# Patient Record
Sex: FEMALE | Race: WHITE | Employment: OTHER | URBAN - METROPOLITAN AREA
[De-identification: names, ages, dates, MRNs, and addresses within clinical notes are randomized per-mention and may not be internally consistent; named-entity substitution may affect disease eponyms.]

---

## 2018-03-31 PROBLEM — G93.41 ACUTE METABOLIC ENCEPHALOPATHY: Status: ACTIVE | Noted: 2018-03-31

## 2018-03-31 PROBLEM — I10 HTN (HYPERTENSION): Status: ACTIVE | Noted: 2018-03-31

## 2018-03-31 PROBLEM — N39.0 UTI (URINARY TRACT INFECTION): Status: ACTIVE | Noted: 2018-03-31

## 2018-04-30 PROBLEM — N39.0 UTI (URINARY TRACT INFECTION): Status: RESOLVED | Noted: 2018-03-31 | Resolved: 2018-04-30

## 2018-08-30 ENCOUNTER — HOSPITAL ENCOUNTER (INPATIENT)
Age: 83
LOS: 2 days | Discharge: HOME HEALTH | DRG: 202 | End: 2018-09-02
Attending: EMERGENCY MEDICINE | Admitting: INTERNAL MEDICINE
Payer: MEDICARE

## 2018-08-30 ENCOUNTER — APPOINTMENT (OUTPATIENT)
Dept: GENERAL RADIOLOGY | Age: 83
DRG: 202 | End: 2018-08-30
Payer: MEDICARE

## 2018-08-30 DIAGNOSIS — A41.9 SEPSIS, DUE TO UNSPECIFIED ORGANISM: Primary | ICD-10-CM

## 2018-08-30 DIAGNOSIS — J40 BRONCHITIS: ICD-10-CM

## 2018-08-30 DIAGNOSIS — R41.82 ALTERED MENTAL STATUS, UNSPECIFIED ALTERED MENTAL STATUS TYPE: ICD-10-CM

## 2018-08-30 LAB
ANION GAP SERPL CALCULATED.3IONS-SCNC: 16 MMOL/L (ref 3–16)
B-TYPE NATRIURETIC PEPTIDE: 59 PG/ML (ref 0–99.9)
BASE EXCESS VENOUS: 7 (ref -3–3)
BASOPHILS ABSOLUTE: 0 K/UL (ref 0–0.2)
BASOPHILS RELATIVE PERCENT: 0.3 %
BILIRUBIN URINE: ABNORMAL
BLOOD, URINE: ABNORMAL
BUN BLDV-MCNC: 19 MG/DL (ref 7–20)
CALCIUM SERPL-MCNC: 9.5 MG/DL (ref 8.3–10.6)
CHLORIDE BLD-SCNC: 96 MMOL/L (ref 99–110)
CLARITY: CLEAR
CO2: 26 MMOL/L (ref 21–32)
COLOR: YELLOW
CREAT SERPL-MCNC: 0.9 MG/DL (ref 0.6–1.2)
EOSINOPHILS ABSOLUTE: 0 K/UL (ref 0–0.6)
EOSINOPHILS RELATIVE PERCENT: 0 %
EPITHELIAL CELLS, UA: ABNORMAL /HPF
GFR AFRICAN AMERICAN: >60
GFR NON-AFRICAN AMERICAN: 59
GLUCOSE BLD-MCNC: 145 MG/DL (ref 70–99)
GLUCOSE URINE: NEGATIVE MG/DL
HCO3 VENOUS: 31 MMOL/L (ref 23–29)
HCT VFR BLD CALC: 45.4 % (ref 36–48)
HEMOGLOBIN: 15.4 G/DL (ref 12–16)
KETONES, URINE: 40 MG/DL
LACTATE: 2.23 MMOL/L (ref 0.4–2)
LACTIC ACID, SEPSIS: 1.3 MMOL/L (ref 0.4–1.9)
LEUKOCYTE ESTERASE, URINE: NEGATIVE
LYMPHOCYTES ABSOLUTE: 0.9 K/UL (ref 1–5.1)
LYMPHOCYTES RELATIVE PERCENT: 5.7 %
MCH RBC QN AUTO: 33.4 PG (ref 26–34)
MCHC RBC AUTO-ENTMCNC: 34 G/DL (ref 31–36)
MCV RBC AUTO: 98.2 FL (ref 80–100)
MICROSCOPIC EXAMINATION: YES
MONOCYTES ABSOLUTE: 1.2 K/UL (ref 0–1.3)
MONOCYTES RELATIVE PERCENT: 7.4 %
MUCUS: ABNORMAL /LPF
NEUTROPHILS ABSOLUTE: 13.6 K/UL (ref 1.7–7.7)
NEUTROPHILS RELATIVE PERCENT: 86.6 %
NITRITE, URINE: NEGATIVE
O2 SAT, VEN: 28 %
PCO2, VEN: 46.6 MM HG (ref 40–50)
PDW BLD-RTO: 14.7 % (ref 12.4–15.4)
PERFORMED ON: ABNORMAL
PERFORMED ON: NORMAL
PH UA: 6
PH VENOUS: 7.43 (ref 7.35–7.45)
PLATELET # BLD: 196 K/UL (ref 135–450)
PMV BLD AUTO: 8.9 FL (ref 5–10.5)
PO2, VEN: 18 MM HG
POC SAMPLE TYPE: ABNORMAL
POC SAMPLE TYPE: NORMAL
POTASSIUM REFLEX MAGNESIUM: 5 MMOL/L (ref 3.5–5.1)
PROTEIN UA: NEGATIVE MG/DL
RBC # BLD: 4.62 M/UL (ref 4–5.2)
RBC UA: ABNORMAL /HPF (ref 0–2)
SODIUM BLD-SCNC: 138 MMOL/L (ref 136–145)
SPECIFIC GRAVITY UA: >=1.03
TCO2 CALC VENOUS: 32 MMOL/L
URINE TYPE: ABNORMAL
UROBILINOGEN, URINE: 0.2 E.U./DL
WBC # BLD: 15.7 K/UL (ref 4–11)
WBC UA: ABNORMAL /HPF (ref 0–5)

## 2018-08-30 PROCEDURE — 82803 BLOOD GASES ANY COMBINATION: CPT

## 2018-08-30 PROCEDURE — 80048 BASIC METABOLIC PNL TOTAL CA: CPT

## 2018-08-30 PROCEDURE — 96365 THER/PROPH/DIAG IV INF INIT: CPT

## 2018-08-30 PROCEDURE — 6370000000 HC RX 637 (ALT 250 FOR IP): Performed by: PHYSICIAN ASSISTANT

## 2018-08-30 PROCEDURE — 99285 EMERGENCY DEPT VISIT HI MDM: CPT

## 2018-08-30 PROCEDURE — 87040 BLOOD CULTURE FOR BACTERIA: CPT

## 2018-08-30 PROCEDURE — 93005 ELECTROCARDIOGRAM TRACING: CPT | Performed by: PHYSICIAN ASSISTANT

## 2018-08-30 PROCEDURE — 85025 COMPLETE CBC W/AUTO DIFF WBC: CPT

## 2018-08-30 PROCEDURE — 94667 MNPJ CHEST WALL 1ST: CPT

## 2018-08-30 PROCEDURE — 71045 X-RAY EXAM CHEST 1 VIEW: CPT

## 2018-08-30 PROCEDURE — 94664 DEMO&/EVAL PT USE INHALER: CPT

## 2018-08-30 PROCEDURE — 81001 URINALYSIS AUTO W/SCOPE: CPT

## 2018-08-30 PROCEDURE — 6360000002 HC RX W HCPCS: Performed by: PHYSICIAN ASSISTANT

## 2018-08-30 PROCEDURE — 83880 ASSAY OF NATRIURETIC PEPTIDE: CPT

## 2018-08-30 PROCEDURE — 83605 ASSAY OF LACTIC ACID: CPT

## 2018-08-30 PROCEDURE — 94761 N-INVAS EAR/PLS OXIMETRY MLT: CPT

## 2018-08-30 PROCEDURE — 2580000003 HC RX 258: Performed by: PHYSICIAN ASSISTANT

## 2018-08-30 RX ORDER — SODIUM CHLORIDE 0.9 % (FLUSH) 0.9 %
10 SYRINGE (ML) INJECTION PRN
Status: DISCONTINUED | OUTPATIENT
Start: 2018-08-30 | End: 2018-09-02 | Stop reason: HOSPADM

## 2018-08-30 RX ORDER — 0.9 % SODIUM CHLORIDE 0.9 %
15 INTRAVENOUS SOLUTION INTRAVENOUS ONCE
Status: COMPLETED | OUTPATIENT
Start: 2018-08-30 | End: 2018-08-30

## 2018-08-30 RX ORDER — SODIUM CHLORIDE 0.9 % (FLUSH) 0.9 %
10 SYRINGE (ML) INJECTION EVERY 12 HOURS SCHEDULED
Status: DISCONTINUED | OUTPATIENT
Start: 2018-08-30 | End: 2018-09-02 | Stop reason: HOSPADM

## 2018-08-30 RX ADMIN — LIDOCAINE HYDROCHLORIDE 15 ML: 20 SOLUTION ORAL; TOPICAL at 23:54

## 2018-08-30 RX ADMIN — Medication 10 ML: at 20:41

## 2018-08-30 RX ADMIN — CEFTRIAXONE 1 G: 1 INJECTION, POWDER, FOR SOLUTION INTRAMUSCULAR; INTRAVENOUS at 20:44

## 2018-08-30 RX ADMIN — SODIUM CHLORIDE 803 ML: 9 INJECTION, SOLUTION INTRAVENOUS at 20:43

## 2018-08-31 PROBLEM — J20.9 ACUTE BRONCHITIS: Status: ACTIVE | Noted: 2018-08-31

## 2018-08-31 PROBLEM — F03.90 DEMENTIA (HCC): Chronic | Status: ACTIVE | Noted: 2018-08-31

## 2018-08-31 PROBLEM — G93.41 ACUTE METABOLIC ENCEPHALOPATHY: Status: ACTIVE | Noted: 2018-08-31

## 2018-08-31 PROBLEM — A41.9 SEPSIS (HCC): Status: ACTIVE | Noted: 2018-08-31

## 2018-08-31 LAB
HCT VFR BLD CALC: 44.1 % (ref 36–48)
HEMOGLOBIN: 15 G/DL (ref 12–16)
LACTIC ACID, SEPSIS: 1 MMOL/L (ref 0.4–1.9)
MCH RBC QN AUTO: 33.4 PG (ref 26–34)
MCHC RBC AUTO-ENTMCNC: 33.9 G/DL (ref 31–36)
MCV RBC AUTO: 98.5 FL (ref 80–100)
PDW BLD-RTO: 14.3 % (ref 12.4–15.4)
PLATELET # BLD: 162 K/UL (ref 135–450)
PMV BLD AUTO: 9.7 FL (ref 5–10.5)
RAPID INFLUENZA  B AGN: NEGATIVE
RAPID INFLUENZA A AGN: NEGATIVE
RBC # BLD: 4.48 M/UL (ref 4–5.2)
WBC # BLD: 14.5 K/UL (ref 4–11)

## 2018-08-31 PROCEDURE — 94668 MNPJ CHEST WALL SBSQ: CPT

## 2018-08-31 PROCEDURE — 94640 AIRWAY INHALATION TREATMENT: CPT

## 2018-08-31 PROCEDURE — 31720 CLEARANCE OF AIRWAYS: CPT

## 2018-08-31 PROCEDURE — 87070 CULTURE OTHR SPECIMN AEROBIC: CPT

## 2018-08-31 PROCEDURE — 94150 VITAL CAPACITY TEST: CPT

## 2018-08-31 PROCEDURE — 85027 COMPLETE CBC AUTOMATED: CPT

## 2018-08-31 PROCEDURE — 94664 DEMO&/EVAL PT USE INHALER: CPT

## 2018-08-31 PROCEDURE — 87804 INFLUENZA ASSAY W/OPTIC: CPT

## 2018-08-31 PROCEDURE — 2700000000 HC OXYGEN THERAPY PER DAY

## 2018-08-31 PROCEDURE — 6360000002 HC RX W HCPCS

## 2018-08-31 PROCEDURE — 6360000002 HC RX W HCPCS: Performed by: FAMILY MEDICINE

## 2018-08-31 PROCEDURE — 6370000000 HC RX 637 (ALT 250 FOR IP): Performed by: FAMILY MEDICINE

## 2018-08-31 PROCEDURE — 87205 SMEAR GRAM STAIN: CPT

## 2018-08-31 PROCEDURE — 2580000003 HC RX 258: Performed by: FAMILY MEDICINE

## 2018-08-31 PROCEDURE — 2580000003 HC RX 258: Performed by: PHYSICIAN ASSISTANT

## 2018-08-31 PROCEDURE — 6360000002 HC RX W HCPCS: Performed by: PHYSICIAN ASSISTANT

## 2018-08-31 PROCEDURE — 94761 N-INVAS EAR/PLS OXIMETRY MLT: CPT

## 2018-08-31 PROCEDURE — 36415 COLL VENOUS BLD VENIPUNCTURE: CPT

## 2018-08-31 PROCEDURE — 1200000000 HC SEMI PRIVATE

## 2018-08-31 RX ORDER — GUAIFENESIN/DEXTROMETHORPHAN 100-10MG/5
5 SYRUP ORAL EVERY 4 HOURS PRN
Status: DISCONTINUED | OUTPATIENT
Start: 2018-08-31 | End: 2018-09-02 | Stop reason: HOSPADM

## 2018-08-31 RX ORDER — ALBUTEROL SULFATE 2.5 MG/3ML
2.5 SOLUTION RESPIRATORY (INHALATION) 4 TIMES DAILY
Status: DISCONTINUED | OUTPATIENT
Start: 2018-08-31 | End: 2018-09-02 | Stop reason: HOSPADM

## 2018-08-31 RX ORDER — 0.9 % SODIUM CHLORIDE 0.9 %
15 INTRAVENOUS SOLUTION INTRAVENOUS ONCE
Status: COMPLETED | OUTPATIENT
Start: 2018-08-31 | End: 2018-08-31

## 2018-08-31 RX ORDER — FOLIC ACID 1 MG/1
1 TABLET ORAL DAILY
Status: DISCONTINUED | OUTPATIENT
Start: 2018-08-31 | End: 2018-09-02 | Stop reason: HOSPADM

## 2018-08-31 RX ORDER — ALBUTEROL SULFATE 2.5 MG/3ML
2.5 SOLUTION RESPIRATORY (INHALATION)
Status: DISCONTINUED | OUTPATIENT
Start: 2018-08-31 | End: 2018-08-31

## 2018-08-31 RX ORDER — SODIUM CHLORIDE 0.9 % (FLUSH) 0.9 %
10 SYRINGE (ML) INJECTION PRN
Status: DISCONTINUED | OUTPATIENT
Start: 2018-08-31 | End: 2018-08-31

## 2018-08-31 RX ORDER — ALBUTEROL SULFATE 2.5 MG/3ML
2.5 SOLUTION RESPIRATORY (INHALATION) EVERY 6 HOURS PRN
Status: DISCONTINUED | OUTPATIENT
Start: 2018-08-31 | End: 2018-09-02 | Stop reason: HOSPADM

## 2018-08-31 RX ORDER — GUAIFENESIN 600 MG/1
600 TABLET, EXTENDED RELEASE ORAL 2 TIMES DAILY
Status: DISCONTINUED | OUTPATIENT
Start: 2018-08-31 | End: 2018-09-02 | Stop reason: HOSPADM

## 2018-08-31 RX ORDER — LEVOTHYROXINE SODIUM 0.03 MG/1
25 TABLET ORAL DAILY
Status: DISCONTINUED | OUTPATIENT
Start: 2018-08-31 | End: 2018-09-02 | Stop reason: HOSPADM

## 2018-08-31 RX ORDER — ACETAMINOPHEN 325 MG/1
650 TABLET ORAL EVERY 4 HOURS PRN
Status: DISCONTINUED | OUTPATIENT
Start: 2018-08-31 | End: 2018-09-02 | Stop reason: HOSPADM

## 2018-08-31 RX ORDER — SODIUM CHLORIDE 0.9 % (FLUSH) 0.9 %
10 SYRINGE (ML) INJECTION EVERY 12 HOURS SCHEDULED
Status: DISCONTINUED | OUTPATIENT
Start: 2018-08-31 | End: 2018-08-31

## 2018-08-31 RX ORDER — ONDANSETRON 2 MG/ML
4 INJECTION INTRAMUSCULAR; INTRAVENOUS EVERY 8 HOURS PRN
Status: DISCONTINUED | OUTPATIENT
Start: 2018-08-31 | End: 2018-09-02 | Stop reason: HOSPADM

## 2018-08-31 RX ORDER — LISINOPRIL 20 MG/1
20 TABLET ORAL DAILY
Status: DISCONTINUED | OUTPATIENT
Start: 2018-08-31 | End: 2018-09-02 | Stop reason: HOSPADM

## 2018-08-31 RX ORDER — ONDANSETRON 2 MG/ML
4 INJECTION INTRAMUSCULAR; INTRAVENOUS ONCE
Status: COMPLETED | OUTPATIENT
Start: 2018-08-31 | End: 2018-08-31

## 2018-08-31 RX ORDER — PANTOPRAZOLE SODIUM 40 MG/1
40 TABLET, DELAYED RELEASE ORAL DAILY
Status: DISCONTINUED | OUTPATIENT
Start: 2018-08-31 | End: 2018-09-02 | Stop reason: HOSPADM

## 2018-08-31 RX ORDER — AZITHROMYCIN 250 MG/1
250 TABLET, FILM COATED ORAL DAILY
Status: DISCONTINUED | OUTPATIENT
Start: 2018-08-31 | End: 2018-09-02 | Stop reason: HOSPADM

## 2018-08-31 RX ORDER — ONDANSETRON 2 MG/ML
INJECTION INTRAMUSCULAR; INTRAVENOUS
Status: COMPLETED
Start: 2018-08-31 | End: 2018-08-31

## 2018-08-31 RX ADMIN — ALBUTEROL SULFATE 2.5 MG: 2.5 SOLUTION RESPIRATORY (INHALATION) at 03:12

## 2018-08-31 RX ADMIN — AZITHROMYCIN 250 MG: 250 TABLET, FILM COATED ORAL at 10:47

## 2018-08-31 RX ADMIN — CEFTRIAXONE 1 G: 1 INJECTION, POWDER, FOR SOLUTION INTRAMUSCULAR; INTRAVENOUS at 21:14

## 2018-08-31 RX ADMIN — ENOXAPARIN SODIUM 30 MG: 30 INJECTION SUBCUTANEOUS at 10:48

## 2018-08-31 RX ADMIN — ALBUTEROL SULFATE 2.5 MG: 2.5 SOLUTION RESPIRATORY (INHALATION) at 11:57

## 2018-08-31 RX ADMIN — ALBUTEROL SULFATE 2.5 MG: 2.5 SOLUTION RESPIRATORY (INHALATION) at 22:06

## 2018-08-31 RX ADMIN — GUAIFENESIN 600 MG: 600 TABLET, EXTENDED RELEASE ORAL at 21:14

## 2018-08-31 RX ADMIN — ONDANSETRON 4 MG: 2 INJECTION INTRAMUSCULAR; INTRAVENOUS at 01:28

## 2018-08-31 RX ADMIN — ONDANSETRON HYDROCHLORIDE 4 MG: 2 INJECTION, SOLUTION INTRAMUSCULAR; INTRAVENOUS at 01:28

## 2018-08-31 RX ADMIN — GUAIFENESIN 600 MG: 600 TABLET, EXTENDED RELEASE ORAL at 10:48

## 2018-08-31 RX ADMIN — LISINOPRIL 20 MG: 20 TABLET ORAL at 10:48

## 2018-08-31 RX ADMIN — PANTOPRAZOLE SODIUM 40 MG: 40 TABLET, DELAYED RELEASE ORAL at 10:48

## 2018-08-31 RX ADMIN — FOLIC ACID 1 MG: 1 TABLET ORAL at 10:47

## 2018-08-31 RX ADMIN — GUAIFENESIN 600 MG: 600 TABLET, EXTENDED RELEASE ORAL at 06:23

## 2018-08-31 RX ADMIN — LEVOTHYROXINE SODIUM 25 MCG: 25 TABLET ORAL at 06:22

## 2018-08-31 RX ADMIN — ALBUTEROL SULFATE 2.5 MG: 2.5 SOLUTION RESPIRATORY (INHALATION) at 08:14

## 2018-08-31 RX ADMIN — ALBUTEROL SULFATE 2.5 MG: 2.5 SOLUTION RESPIRATORY (INHALATION) at 15:45

## 2018-08-31 RX ADMIN — Medication 10 ML: at 21:14

## 2018-08-31 RX ADMIN — Medication 10 ML: at 10:48

## 2018-08-31 RX ADMIN — GUAIFENESIN AND DEXTROMETHORPHAN 5 ML: 100; 10 SYRUP ORAL at 12:45

## 2018-08-31 RX ADMIN — SODIUM CHLORIDE 803 ML: 9 INJECTION, SOLUTION INTRAVENOUS at 06:23

## 2018-08-31 ASSESSMENT — PAIN DESCRIPTION - DESCRIPTORS: DESCRIPTORS: DISCOMFORT

## 2018-08-31 ASSESSMENT — PAIN DESCRIPTION - PAIN TYPE: TYPE: ACUTE PAIN

## 2018-08-31 ASSESSMENT — PAIN DESCRIPTION - FREQUENCY: FREQUENCY: CONTINUOUS

## 2018-08-31 ASSESSMENT — PAIN DESCRIPTION - LOCATION: LOCATION: THROAT

## 2018-08-31 ASSESSMENT — PAIN DESCRIPTION - PROGRESSION: CLINICAL_PROGRESSION: NOT CHANGED

## 2018-08-31 ASSESSMENT — PAIN SCALES - GENERAL: PAINLEVEL_OUTOF10: 7

## 2018-08-31 ASSESSMENT — PAIN DESCRIPTION - ONSET: ONSET: ON-GOING

## 2018-08-31 NOTE — H&P
Hospital Medicine History & Physical      PCP: Jannet Selby    Date of Admission: 8/30/2018    Date of Service: Pt seen/examined on 8/30/2018  7:30 PM and   Admitted to Inpatient with expected LOS greater than two midnights due to medical therapy. Chief Complaint:  Cough    History Of Present Illness:    80 y.o. female with PMHx significant for esophageal stricture dementia  presented to [facility] from home   with coughing  These symptoms started 2-3 days ago  Pt complains of: Productive cough  Pt denies/without: night sweats, weight changes, poor appetite, headache, chest pain, palpatations, pleuritic chest pain, shorness of breath, nausea, vomiting, diarrhea, dysuria, hematuria, hematochezia, melena, edema, rash, weakness, confusion, lightheadedness or paresthesia. The patient has not tried any additional therapies or medications in addition to their regular medicines in order to modulate their symptoms. Past Medical History:          Diagnosis Date    Hypertension        Past Surgical History:          Procedure Laterality Date    OTHER SURGICAL HISTORY  12/4/14    ERCP foreigh body removal       Medications Prior to Admission:      Prior to Admission medications    Medication Sig Start Date End Date Taking? Authorizing Provider   folic acid (FOLVITE) 1 MG tablet Take 1 tablet by mouth daily 4/4/18  Yes Loretta Trujillo MD   levothyroxine (SYNTHROID) 25 MCG tablet Take 1 tablet by mouth Daily 4/4/18  Yes Loretta Trujillo MD   esomeprazole Magnesium (NEXIUM) 40 MG PACK Take 1 packet by mouth 2 times daily. 12/5/14  Yes Lidia Arriaga MD   lisinopril (PRINIVIL;ZESTRIL) 20 MG tablet Take 20 mg by mouth daily. Yes Historical Provider, MD       Allergies:  Cephalosporins    Social History:      The patient currently lives Alone    TOBACCO:   reports that she has quit smoking. Her smoking use included Cigarettes. She quit after 10.00 years of use.  She does not have any smokeless tobacco history on file. ETOH:   reports that she drinks about 0.6 oz of alcohol per week . Family History:      Reviewed in detail and negative for DM, CAD, Cancer, CVA. Positive as follows:    History reviewed. No pertinent family history. REVIEW OF SYSTEMS:   Pertinent positives as noted in the HPI. All other systems reviewed and negative. PHYSICAL EXAM:    BP (!) 159/84   Pulse 88   Temp 98.8 °F (37.1 °C) (Oral)   Resp 16   Ht 5' 2\" (1.575 m)   Wt 118 lb (53.5 kg)   SpO2 98%   BMI 21.58 kg/m²     General appearance:  No apparent distress, appears stated age and cooperative. HEENT:  Normal cephalic, atraumatic without obvious deformity. Pupils equal, round, and reactive to light. Extra ocular muscles intact. Conjunctivae/corneas clear. Neck: Supple, with full range of motion. No jugular venous distention. Trachea midline. Respiratory:  Normal respiratory effort. Clear to auscultation, bilaterally without RALES/WHEEZES/RHONCHI. Cardiovascular:  Regular rate and rhythm with normal S1/S2 without MURMUR, rubs or gallops. Abdomen: Soft, non-tender, non-distended with normal bowel sounds. Musculoskeletal:  No clubbing, cyanosis or EDEMA bilaterally. Full range of motion without deformity. Skin: Skin color, texture, turgor normal.  No rashes or lesions. Neurologic:  Neurovascularly intact without any focal sensory/motor deficits. Cranial nerves: II-XII intact, grossly non-focal.  Psychiatric:  Alert and oriented, thought content appropriate, normal insight  Capillary Refill: Brisk,< 3 seconds   Peripheral Pulses: +2 palpable, equal bilaterally       Labs:     Recent Labs      08/30/18 2002 08/31/18   0631   WBC  15.7*  14.5*   HGB  15.4  15.0   HCT  45.4  44.1   PLT  196  162     Recent Labs      08/30/18 2002   NA  138   K  5.0   CL  96*   CO2  26   BUN  19   CREATININE  0.9   CALCIUM  9.5     No results for input(s): AST, ALT, BILIDIR, BILITOT, ALKPHOS in the last 72 hours.   No results for

## 2018-08-31 NOTE — PROGRESS NOTES
RESPIRATORY THERAPY ASSESSMENT    Name:  Terri London  Medical Record Number:  [id number]  Age: 80 y.o. Gender: female  : 3/26/1927  Today's Date:  2018  Room:  63096309-01    Assessment     Is the patient being admitted for a COPD or Asthma exacerbation? No   (If yes the patient will be seen every 4 hours for the first 24 hours and then reassessed)    Patient Admission Diagnosis acute bronchitis      Allergies  Allergies   Allergen Reactions    Cephalosporins Rash       Minimum Predicted Vital Capacity:     748          Actual Vital Capacity:      250          Pulmonary History:former smoker but pt denies any pulmonary history  Home Oxygen Therapy:  room air  Home Respiratory Therapy:None   Current Respiratory Therapy:  hhn with albuterol q4wa  Treatment Type: Vibratory Mucous Clearing Therapy or Intervention       Respiratory Severity Index(RSI)   Patients with orders for inhalation medications, oxygen, or any therapeutic treatment modality will be placed on Respiratory Protocol. They will be assessed with the first treatment and at least every 72 hours thereafter. The following severity scale will be used to determine frequency of treatment intervention.     Smoking History: Pulmonary Disease or Smoking History, Greater than 15 pack year = 2    Social History  Social History   Substance Use Topics    Smoking status: Former Smoker     Years: 10.00     Types: Cigarettes    Smokeless tobacco: Not on file    Alcohol use 0.6 oz/week     1 Glasses of wine per week      Comment: occasionally        Recent Surgical History: None = 0  Past Surgical History  Past Surgical History:   Procedure Laterality Date    OTHER SURGICAL HISTORY  14    ERCP foreigh body removal       Level of Consciousness: Alert, Oriented, and Cooperative = 0    Level of Activity: Walking with assistance = 1    Respiratory Pattern: Dyspnea with exertion;Irregular pattern;or RR less than 6 = 2    Breath Sounds: Absent bilaterally and/or with wheezes = 3    Sputum  Sputum Color: Unable to assess, Tenacity: Frothy, Sputum How Obtained: Cough on request, Spontaneous cough  Cough: Strong, productive = 1    Vital Signs   BP (!) 172/89   Pulse 95   Temp 98 °F (36.7 °C) (Oral)   Resp 18   Ht 5' 2\" (1.575 m)   Wt 118 lb (53.5 kg)   SpO2 94%   BMI 21.58 kg/m²   SPO2 (COPD values may differ): 90-91% on room air or greater than 92% on FiO2 24- 28% = 1    Peak Flow (asthma only): not applicable = 0    RSI: 61-81 = Q6H or QID and Q4HPRN for dyspnea        Plan       Goals: medication delivery, mobilize retained secretions, volume expansion and improve oxygenation    Patient/caregiver was educated on the proper method of use for Respiratory Care Devices:  Yes      Level of patient/caregiver understanding able to:   [] Verbalize understanding   [] Demonstrate understanding       [] Teach back        [] Needs reinforcement       []  No available caregiver               []  Other:     Response to education:  Tiff Mcintosh     Is patient being placed on Home Treatment Regimen? No     Does the patient have everything they need prior to discharge? NA     Comments: pt admitted through ED tonight with acute bronchitis    Plan of Care: continue hhn with albuterol/ns qid and prn    Electronically signed by Deric Uribe RCP on 8/31/2018 at 3:26 AM    Respiratory Protocol Guidelines     1. Assessment and treatment by Respiratory Therapy will be initiated for medication and therapeutic interventions upon initiation of aerosolized medication. 2. Physician will be contacted for respiratory rate (RR) greater than 35 breaths per minute. Therapy will be held for heart rate (HR) greater than 140 beats per minute, pending direction from physician. 3. Bronchodilators will be administered via Metered Dose Inhaler (MDI) with spacer when the following criteria are met:  a.  Alert and cooperative     b. HR < 140 bpm  c. RR < 30 bpm                d. Can

## 2018-09-01 LAB
ANION GAP SERPL CALCULATED.3IONS-SCNC: 9 MMOL/L (ref 3–16)
BUN BLDV-MCNC: 14 MG/DL (ref 7–20)
CALCIUM SERPL-MCNC: 8.7 MG/DL (ref 8.3–10.6)
CHLORIDE BLD-SCNC: 101 MMOL/L (ref 99–110)
CO2: 26 MMOL/L (ref 21–32)
CREAT SERPL-MCNC: 0.9 MG/DL (ref 0.6–1.2)
GFR AFRICAN AMERICAN: >60
GFR NON-AFRICAN AMERICAN: 59
GLUCOSE BLD-MCNC: 92 MG/DL (ref 70–99)
HCT VFR BLD CALC: 38.4 % (ref 36–48)
HEMOGLOBIN: 13 G/DL (ref 12–16)
MCH RBC QN AUTO: 33.4 PG (ref 26–34)
MCHC RBC AUTO-ENTMCNC: 33.8 G/DL (ref 31–36)
MCV RBC AUTO: 98.9 FL (ref 80–100)
PDW BLD-RTO: 14.3 % (ref 12.4–15.4)
PLATELET # BLD: 133 K/UL (ref 135–450)
PMV BLD AUTO: 9.2 FL (ref 5–10.5)
POTASSIUM SERPL-SCNC: 3.6 MMOL/L (ref 3.5–5.1)
RBC # BLD: 3.88 M/UL (ref 4–5.2)
SODIUM BLD-SCNC: 136 MMOL/L (ref 136–145)
WBC # BLD: 8.7 K/UL (ref 4–11)

## 2018-09-01 PROCEDURE — 6360000002 HC RX W HCPCS: Performed by: FAMILY MEDICINE

## 2018-09-01 PROCEDURE — 85027 COMPLETE CBC AUTOMATED: CPT

## 2018-09-01 PROCEDURE — 36415 COLL VENOUS BLD VENIPUNCTURE: CPT

## 2018-09-01 PROCEDURE — 94664 DEMO&/EVAL PT USE INHALER: CPT

## 2018-09-01 PROCEDURE — 6370000000 HC RX 637 (ALT 250 FOR IP): Performed by: FAMILY MEDICINE

## 2018-09-01 PROCEDURE — 6360000002 HC RX W HCPCS: Performed by: PHYSICIAN ASSISTANT

## 2018-09-01 PROCEDURE — 94640 AIRWAY INHALATION TREATMENT: CPT

## 2018-09-01 PROCEDURE — 94761 N-INVAS EAR/PLS OXIMETRY MLT: CPT

## 2018-09-01 PROCEDURE — 80048 BASIC METABOLIC PNL TOTAL CA: CPT

## 2018-09-01 PROCEDURE — 94668 MNPJ CHEST WALL SBSQ: CPT

## 2018-09-01 PROCEDURE — 1200000000 HC SEMI PRIVATE

## 2018-09-01 PROCEDURE — 2580000003 HC RX 258: Performed by: PHYSICIAN ASSISTANT

## 2018-09-01 RX ORDER — UREA 10 %
3 LOTION (ML) TOPICAL NIGHTLY PRN
Status: DISCONTINUED | OUTPATIENT
Start: 2018-09-01 | End: 2018-09-02 | Stop reason: HOSPADM

## 2018-09-01 RX ADMIN — FOLIC ACID 1 MG: 1 TABLET ORAL at 08:19

## 2018-09-01 RX ADMIN — GUAIFENESIN AND DEXTROMETHORPHAN 5 ML: 100; 10 SYRUP ORAL at 13:04

## 2018-09-01 RX ADMIN — AZITHROMYCIN 250 MG: 250 TABLET, FILM COATED ORAL at 08:18

## 2018-09-01 RX ADMIN — ALBUTEROL SULFATE 2.5 MG: 2.5 SOLUTION RESPIRATORY (INHALATION) at 16:32

## 2018-09-01 RX ADMIN — PANTOPRAZOLE SODIUM 40 MG: 40 TABLET, DELAYED RELEASE ORAL at 08:19

## 2018-09-01 RX ADMIN — LISINOPRIL 20 MG: 20 TABLET ORAL at 08:18

## 2018-09-01 RX ADMIN — ALBUTEROL SULFATE 2.5 MG: 2.5 SOLUTION RESPIRATORY (INHALATION) at 20:24

## 2018-09-01 RX ADMIN — GUAIFENESIN 600 MG: 600 TABLET, EXTENDED RELEASE ORAL at 08:18

## 2018-09-01 RX ADMIN — Medication 10 ML: at 08:19

## 2018-09-01 RX ADMIN — GUAIFENESIN 600 MG: 600 TABLET, EXTENDED RELEASE ORAL at 21:15

## 2018-09-01 RX ADMIN — ALBUTEROL SULFATE 2.5 MG: 2.5 SOLUTION RESPIRATORY (INHALATION) at 13:22

## 2018-09-01 RX ADMIN — LEVOTHYROXINE SODIUM 25 MCG: 25 TABLET ORAL at 05:32

## 2018-09-01 RX ADMIN — ACETAMINOPHEN 650 MG: 325 TABLET ORAL at 13:04

## 2018-09-01 RX ADMIN — ENOXAPARIN SODIUM 30 MG: 30 INJECTION SUBCUTANEOUS at 08:19

## 2018-09-01 RX ADMIN — GUAIFENESIN AND DEXTROMETHORPHAN 5 ML: 100; 10 SYRUP ORAL at 21:15

## 2018-09-01 RX ADMIN — Medication 10 ML: at 21:04

## 2018-09-01 RX ADMIN — CEFTRIAXONE 1 G: 1 INJECTION, POWDER, FOR SOLUTION INTRAMUSCULAR; INTRAVENOUS at 21:04

## 2018-09-01 RX ADMIN — ACETAMINOPHEN 650 MG: 325 TABLET ORAL at 01:42

## 2018-09-01 RX ADMIN — ALBUTEROL SULFATE 2.5 MG: 2.5 SOLUTION RESPIRATORY (INHALATION) at 09:46

## 2018-09-01 ASSESSMENT — PAIN DESCRIPTION - LOCATION: LOCATION: HEAD

## 2018-09-01 ASSESSMENT — PAIN SCALES - GENERAL
PAINLEVEL_OUTOF10: 3
PAINLEVEL_OUTOF10: 0
PAINLEVEL_OUTOF10: 3

## 2018-09-01 ASSESSMENT — PAIN DESCRIPTION - ORIENTATION: ORIENTATION: MID

## 2018-09-01 ASSESSMENT — PAIN DESCRIPTION - PROGRESSION: CLINICAL_PROGRESSION: NOT CHANGED

## 2018-09-01 ASSESSMENT — PAIN DESCRIPTION - PAIN TYPE: TYPE: ACUTE PAIN

## 2018-09-01 ASSESSMENT — PAIN DESCRIPTION - FREQUENCY: FREQUENCY: CONTINUOUS

## 2018-09-01 ASSESSMENT — PAIN DESCRIPTION - ONSET: ONSET: GRADUAL

## 2018-09-01 ASSESSMENT — PAIN DESCRIPTION - DESCRIPTORS: DESCRIPTORS: HEADACHE

## 2018-09-01 NOTE — CARE COORDINATION
2018  [facility])  Clinical Case Management Department    Patient: Mickey Head  MRN: [id number] / : 3/26/1927  ACCT: [de-identified]     Emergency Contacts  Contact 1: Name: Claudia Cortez   Contact 1: Number: [phone]  Contact 1: Relationship: daughter   Contact 2: Name: Lauren Roca   Contact 2: Relationship:    Healthcare Agent Appointed: Angelinaradha Butterfield Agent's Name: [facility] Agent's Phone Number: [phone]    Admission Documentation  Attending Provider: Kim Rodriguez MD  Admit date/time: 2018  7:30 PM  Status: Inpatient  Diagnosis: Acute bronchitis     Readmission within last 30 days:  no     Living Situation  Discharge Planning  Living Arrangements: Alone  Support Systems: Children, Family Members  Type of Home Care Services: Safety Alert  Patient expects to be discharged to[de-identified] home   Expected Discharge Date: 18    Service Assessment       Values / Beliefs  Do you have any ethnic, cultural, sacramental, or spiritual Bahai needs you would like us to be aware of while you are in the hospital?: No    Advance Directives (For Healthcare)  Pre-existing DNR Comfort Care/DNR Arrest/DNI Order: Yes, notify physician for order  Healthcare Directive: Yes, patient has an advance directive for healthcare treatment  Type of Healthcare Directive: Living will, Durable power of  for health care  Copy in Chart: No, copy requested from family  Healthcare Agent Appointed: Angelinaradha Greer Agent's Name: [facility] Agent's Phone Number: [phone]  If you are unable to speak for yourself, does your Healthcare Agent or Legal Spokesperson know your healthcare wishes?: Yes                        Destination  From home alone     Durable Medical Equipment   None [facility] to [facility] [address] care at home?  No Provider:  Provider Phone:   Daughter states an aide from [address] does come to patient's home

## 2018-09-01 NOTE — PLAN OF CARE
Problem: Falls - Risk of:  Goal: Will remain free from falls  Will remain free from falls   Outcome: Ongoing  Patient oriented to ability and appropriately calls for needs. Bed locked in lowest position, alarm set. Fall precautions in place. Problem: Pain:  Goal: Pain level will decrease  Pain level will decrease   Outcome: Ongoing  Patient c/o headache this afternoon. Resting quietly with eyes closed after administration of Tylenol. Patient denies headache this evening. Problem: Nutrition  Goal: Optimal nutrition therapy  Outcome: Ongoing  Pt consumes <50% of meals today. PO intake encouraged, pt verbalizes understanding of importance of nutrition, states \"I just don't have an appetite today\".

## 2018-09-02 VITALS
TEMPERATURE: 99.1 F | WEIGHT: 118 LBS | BODY MASS INDEX: 21.71 KG/M2 | SYSTOLIC BLOOD PRESSURE: 160 MMHG | OXYGEN SATURATION: 95 % | HEIGHT: 62 IN | RESPIRATION RATE: 18 BRPM | HEART RATE: 92 BPM | DIASTOLIC BLOOD PRESSURE: 78 MMHG

## 2018-09-02 LAB
CULTURE, RESPIRATORY: NORMAL
GRAM STAIN RESULT: NORMAL
HCT VFR BLD CALC: 39.8 % (ref 36–48)
HEMOGLOBIN: 13.5 G/DL (ref 12–16)
MCH RBC QN AUTO: 33.8 PG (ref 26–34)
MCHC RBC AUTO-ENTMCNC: 33.9 G/DL (ref 31–36)
MCV RBC AUTO: 99.8 FL (ref 80–100)
PDW BLD-RTO: 14.4 % (ref 12.4–15.4)
PLATELET # BLD: 153 K/UL (ref 135–450)
PMV BLD AUTO: 9.2 FL (ref 5–10.5)
RBC # BLD: 3.99 M/UL (ref 4–5.2)
WBC # BLD: 9 K/UL (ref 4–11)

## 2018-09-02 PROCEDURE — 94640 AIRWAY INHALATION TREATMENT: CPT

## 2018-09-02 PROCEDURE — 6360000002 HC RX W HCPCS: Performed by: FAMILY MEDICINE

## 2018-09-02 PROCEDURE — 6370000000 HC RX 637 (ALT 250 FOR IP): Performed by: FAMILY MEDICINE

## 2018-09-02 PROCEDURE — 36415 COLL VENOUS BLD VENIPUNCTURE: CPT

## 2018-09-02 PROCEDURE — 94761 N-INVAS EAR/PLS OXIMETRY MLT: CPT

## 2018-09-02 PROCEDURE — 2580000003 HC RX 258: Performed by: PHYSICIAN ASSISTANT

## 2018-09-02 PROCEDURE — 85027 COMPLETE CBC AUTOMATED: CPT

## 2018-09-02 RX ORDER — LEVOFLOXACIN 500 MG/1
500 TABLET, FILM COATED ORAL DAILY
Qty: 5 TABLET | Refills: 0 | Status: SHIPPED | OUTPATIENT
Start: 2018-09-02 | End: 2018-09-02

## 2018-09-02 RX ORDER — LEVOFLOXACIN 500 MG/1
500 TABLET, FILM COATED ORAL DAILY
Qty: 5 TABLET | Refills: 0 | Status: SHIPPED | OUTPATIENT
Start: 2018-09-02 | End: 2018-09-07

## 2018-09-02 RX ADMIN — ALBUTEROL SULFATE 2.5 MG: 2.5 SOLUTION RESPIRATORY (INHALATION) at 08:38

## 2018-09-02 RX ADMIN — FOLIC ACID 1 MG: 1 TABLET ORAL at 08:49

## 2018-09-02 RX ADMIN — AZITHROMYCIN 250 MG: 250 TABLET, FILM COATED ORAL at 08:49

## 2018-09-02 RX ADMIN — LISINOPRIL 20 MG: 20 TABLET ORAL at 08:49

## 2018-09-02 RX ADMIN — PANTOPRAZOLE SODIUM 40 MG: 40 TABLET, DELAYED RELEASE ORAL at 08:49

## 2018-09-02 RX ADMIN — ENOXAPARIN SODIUM 30 MG: 30 INJECTION SUBCUTANEOUS at 08:49

## 2018-09-02 RX ADMIN — ACETAMINOPHEN 650 MG: 325 TABLET ORAL at 08:49

## 2018-09-02 RX ADMIN — Medication 10 ML: at 08:50

## 2018-09-02 RX ADMIN — LEVOTHYROXINE SODIUM 25 MCG: 25 TABLET ORAL at 06:19

## 2018-09-02 RX ADMIN — GUAIFENESIN 600 MG: 600 TABLET, EXTENDED RELEASE ORAL at 08:49

## 2018-09-02 ASSESSMENT — PAIN DESCRIPTION - DESCRIPTORS: DESCRIPTORS: HEADACHE

## 2018-09-02 ASSESSMENT — PAIN SCALES - GENERAL
PAINLEVEL_OUTOF10: 0
PAINLEVEL_OUTOF10: 0
PAINLEVEL_OUTOF10: 3

## 2018-09-02 ASSESSMENT — PAIN DESCRIPTION - PAIN TYPE: TYPE: ACUTE PAIN

## 2018-09-02 ASSESSMENT — PAIN DESCRIPTION - LOCATION: LOCATION: HEAD

## 2018-09-02 ASSESSMENT — PAIN DESCRIPTION - FREQUENCY: FREQUENCY: INTERMITTENT

## 2018-09-02 ASSESSMENT — PAIN DESCRIPTION - ORIENTATION: ORIENTATION: POSTERIOR

## 2018-09-02 ASSESSMENT — PAIN DESCRIPTION - PROGRESSION: CLINICAL_PROGRESSION: GRADUALLY WORSENING

## 2018-09-02 ASSESSMENT — PAIN DESCRIPTION - ONSET: ONSET: ON-GOING

## 2018-09-02 NOTE — DISCHARGE SUMMARY
or lesions. Neurologic:  Neurovascularly intact without any focal sensory/motor deficits. Cranial nerves: II-XII intact, grossly non-focal.  Psychiatric:  Alert and oriented x2    Consults:     IP CONSULT TO HOSPITALIST    Labs: For convenience and continuity at follow-up the following most recent labs are provided:    Lab Results   Component Value Date    WBC 9.0 09/02/2018    HGB 13.5 09/02/2018    HCT 39.8 09/02/2018    MCV 99.8 09/02/2018     09/02/2018     09/01/2018    K 3.6 09/01/2018    K 5.0 08/30/2018     09/01/2018    CO2 26 09/01/2018    BUN 14 09/01/2018    CREATININE 0.9 09/01/2018    CALCIUM 8.7 09/01/2018    BNP 59.0 08/30/2018    ALKPHOS 69 03/31/2018    ALT 8 03/31/2018    AST 19 03/31/2018    BILITOT 0.8 03/31/2018    BILIDIR <0.2 03/31/2018    LABALBU 3.4 03/31/2018     No results found for: INR    Radiology:  XR CHEST PORTABLE   Final Result      No acute pulmonary disease. Chronic interstitial fibrosis. Discharge Medications:   Current Discharge Medication List      START taking these medications    Details   levofloxacin (LEVAQUIN) 500 MG tablet Take 1 tablet by mouth daily for 5 days  Qty: 5 tablet, Refills: 0           Current Discharge Medication List        Current Discharge Medication List      CONTINUE these medications which have NOT CHANGED    Details   folic acid (FOLVITE) 1 MG tablet Take 1 tablet by mouth daily  Qty: 30 tablet, Refills: 3      levothyroxine (SYNTHROID) 25 MCG tablet Take 1 tablet by mouth Daily  Qty: 30 tablet, Refills: 3      esomeprazole Magnesium (NEXIUM) 40 MG PACK Take 1 packet by mouth 2 times daily. Qty: 30 packet, Refills: 3      lisinopril (PRINIVIL;ZESTRIL) 20 MG tablet Take 20 mg by mouth daily. Current Discharge Medication List          Follow-up appointments and focus:    Please follow up with your primary care provider in 1-2 weeks for routine hospital follow up.       Condition at Discharge:  Stable    The patient was seen and examined on day of discharge and this discharge summary is in conjunction with any daily progress note from day of discharge. Time Spent on discharge is 45 minutes  in the examination, evaluation, counseling and review of medications and discharge plan. Signed:    Ann Ambriz MD   9/2/2018      Thank you Lashaun Lewis for the opportunity to be involved in this patient's care.  If you have any questions or concerns please feel free to contact me at [phone] (pager)

## 2018-09-02 NOTE — PROGRESS NOTES
D/c order received, IV and tele removed. Per Dr. Rolinda Runner pt ok to d/c without PT/OT evaluations (pt steady, up independently). D/c instructions, medications and follow up discussed with patient. Patient denies further questions. Rx called in to patients pharmacy.

## 2018-09-03 ASSESSMENT — ENCOUNTER SYMPTOMS
CHEST TIGHTNESS: 0
SORE THROAT: 0
SINUS PRESSURE: 0
ABDOMINAL DISTENTION: 0
COUGH: 1
SHORTNESS OF BREATH: 0
EYE ITCHING: 0
DIARRHEA: 0
VOMITING: 0
COLOR CHANGE: 0
RHINORRHEA: 0
ABDOMINAL PAIN: 0
BACK PAIN: 0
EYE REDNESS: 0
EYE PAIN: 0
WHEEZING: 0
NAUSEA: 0

## 2018-09-03 NOTE — ED PROVIDER NOTES
[facility] ENCOUNTER          PHYSICIAN ASSISTANT NOTE       Date of evaluation: 8/30/2018    Chief Complaint     Cough and Altered Mental Status      History of Present Illness     Yue Quach is a 80 y.o. female with a past medical history as noted below who presents to the Emergency Department with a complaint of Increased confusion, fatigue, cough and fevers. The patient's daughter, who presents with the patient, reports that over the past 3-4 days, she has noticed an increased confusion, fatigue, decrease in activity and cough in the patient. She reports that the patient is usually highly functioning at home, able to care for herself. Over the past 3-4 days, she has been increasingly fatigued, often times not getting out of bed. Additionally, the daughter reports that they have been dealing with some decline in memory function in the patient over the past several months. However, over the past 3-4 days, she is noted that her mother has been increasingly confused, often repeating activities, or perseverating. She reports that this confusion is acutely new and much more dramatic than that she's been expecting over the past couple of months. The patient was seen by her primary care physician just a couple of days ago, with concern for developing pharyngitis and was prescribed antibiotics, which the patient has been taking. The patient has a cough productive of yellowish sputum which is new over the past couple of days. Additionally she reports some subjective fevers and chills. The patient reports some posterior pharyngeal pain. She denies any chest pain, nausea or vomiting. She denies being overtly short of breath. Review of Systems     Review of Systems   Constitutional: Positive for activity change, appetite change, chills, fatigue and fever. Negative for diaphoresis and unexpected weight change.    HENT: Negative for congestion, drooling, mouth sores, 12.4 - 15.4 %    Platelets 394 985 - 643 K/uL    MPV 9.2 5.0 - 10.5 fL   POCT Venous   Result Value Ref Range    pH, Skip 7.431 7.350 - 7.450    pCO2, Skip 46.6 40.0 - 50.0 mm Hg    pO2, Skip 18 Not Established mm Hg    HCO3, Venous 31.0 (H) 23.0 - 29.0 mmol/L    Base Excess, Skip 7 (H) -3 - 3    O2 Sat, Skip 28 Not Established %    TC02 (Calc), Skip 32 Not Established mmol/L    Lactate 2.23 (H) 0.40 - 2.00 mmol/L    Sample Type SKPI     Performed on SEE BELOW    POCT Venous   Result Value Ref Range    BNP 59.0 0.0 - 99.9 pg/mL    Sample Type SKIP     Performed on SEE BELOW    EKG 12 Lead   Result Value Ref Range    Ventricular Rate 90 BPM    Atrial Rate 90 BPM    P-R Interval 160 ms    QRS Duration 60 ms    Q-T Interval 364 ms    QTc Calculation (Bazett) 445 ms    P Axis 58 degrees    R Axis 39 degrees    T Axis 80 degrees    Diagnosis       EKG performed in ER and to be interpreted by ER physician. Confirmed by MD, ER ([id number]),  Nona Leslie (OLIVERIO Morocho ([id number]) on 8/31/2018 7:41:07 AM       ED BEDSIDE ULTRASOUND:  N/A    RECENT VITALS:  BP: (!) 160/78, Temp: 99.1 °F (37.3 °C), Pulse: 92, Resp: 18, SpO2: 95 %     Procedures     N/A    ED Course     Nursing Notes, Past Medical Hx, Past Surgical Hx, Social Hx, Allergies, and Family Hx were reviewed.     The patient was given the following medications:  Orders Placed This Encounter   Medications    DISCONTD: sodium chloride flush 0.9 % injection 10 mL    DISCONTD: sodium chloride flush 0.9 % injection 10 mL    0.9 % sodium chloride bolus    DISCONTD: cefTRIAXone (ROCEPHIN) 1 g IVPB in 50 mL D5W minibag    DISCONTD: lidocaine viscous (XYLOCAINE) 2 % solution 15 mL     SEPSIS FOCUS:  Suspected source:  Respiratory  Time source of infection suspected/identified:  1950  Blood cultures collected before antibiotics:  YES/NO: Yes  Empiric Antibiotics given:  YES/NO: Yes  Fluid resuscitation (30 ml/kg) given:  YES/NO: Yes (started with 15 ml/kg to reevaluate respiratory

## 2018-09-04 LAB
BLOOD CULTURE, ROUTINE: NORMAL
CULTURE, BLOOD 2: NORMAL
EKG ATRIAL RATE: 90 BPM
EKG DIAGNOSIS: NORMAL
EKG P AXIS: 58 DEGREES
EKG P-R INTERVAL: 160 MS
EKG Q-T INTERVAL: 364 MS
EKG QRS DURATION: 60 MS
EKG QTC CALCULATION (BAZETT): 445 MS
EKG R AXIS: 39 DEGREES
EKG T AXIS: 80 DEGREES
EKG VENTRICULAR RATE: 90 BPM

## 2020-02-26 ENCOUNTER — NURSE ONLY (OUTPATIENT)
Dept: FAMILY MEDICINE CLINIC | Age: 85
End: 2020-02-26
Payer: MEDICARE

## 2020-02-26 PROCEDURE — 96372 THER/PROPH/DIAG INJ SC/IM: CPT | Performed by: FAMILY MEDICINE

## 2020-02-26 RX ORDER — CYANOCOBALAMIN 1000 UG/ML
1000 INJECTION INTRAMUSCULAR; SUBCUTANEOUS ONCE
Status: COMPLETED | OUTPATIENT
Start: 2020-02-26 | End: 2020-02-26

## 2020-02-26 RX ADMIN — CYANOCOBALAMIN 1000 MCG: 1000 INJECTION INTRAMUSCULAR; SUBCUTANEOUS at 12:36

## 2021-03-19 ENCOUNTER — HOSPITAL ENCOUNTER (EMERGENCY)
Age: 86
Discharge: HOME | End: 2021-03-19
Attending: EMERGENCY MEDICINE
Payer: MEDICARE

## 2021-03-19 ENCOUNTER — APPOINTMENT (OUTPATIENT)
Dept: GENERAL RADIOLOGY | Age: 86
End: 2021-03-19
Payer: MEDICARE

## 2021-03-19 ENCOUNTER — APPOINTMENT (OUTPATIENT)
Dept: CT IMAGING | Age: 86
End: 2021-03-19
Payer: MEDICARE

## 2021-03-19 VITALS
RESPIRATION RATE: 16 BRPM | OXYGEN SATURATION: 88 % | SYSTOLIC BLOOD PRESSURE: 168 MMHG | HEART RATE: 72 BPM | DIASTOLIC BLOOD PRESSURE: 82 MMHG

## 2021-03-19 DIAGNOSIS — E86.0 DEHYDRATION: ICD-10-CM

## 2021-03-19 DIAGNOSIS — R11.2 NAUSEA VOMITING AND DIARRHEA: Primary | ICD-10-CM

## 2021-03-19 DIAGNOSIS — R19.7 NAUSEA VOMITING AND DIARRHEA: Primary | ICD-10-CM

## 2021-03-19 LAB
A/G RATIO: 0.9 (ref 1.1–2.2)
ALBUMIN SERPL-MCNC: 3.7 G/DL (ref 3.4–5)
ALP BLD-CCNC: 66 U/L (ref 40–129)
ALT SERPL-CCNC: 9 U/L (ref 10–40)
ANION GAP SERPL CALCULATED.3IONS-SCNC: 12 MMOL/L (ref 3–16)
AST SERPL-CCNC: 21 U/L (ref 15–37)
BACTERIA: ABNORMAL /HPF
BASE EXCESS VENOUS: 1.3 MMOL/L (ref -2–3)
BASOPHILS ABSOLUTE: 0 K/UL (ref 0–0.2)
BASOPHILS RELATIVE PERCENT: 0.2 %
BILIRUB SERPL-MCNC: 0.7 MG/DL (ref 0–1)
BILIRUBIN URINE: NEGATIVE
BLOOD, URINE: ABNORMAL
BUN BLDV-MCNC: 21 MG/DL (ref 7–20)
CALCIUM SERPL-MCNC: 9 MG/DL (ref 8.3–10.6)
CARBOXYHEMOGLOBIN: 1 % (ref 0–1.5)
CHLORIDE BLD-SCNC: 101 MMOL/L (ref 99–110)
CLARITY: CLEAR
CO2: 25 MMOL/L (ref 21–32)
COLOR: YELLOW
CREAT SERPL-MCNC: 1.2 MG/DL (ref 0.6–1.2)
EKG ATRIAL RATE: 69 BPM
EKG DIAGNOSIS: NORMAL
EKG P AXIS: 51 DEGREES
EKG P-R INTERVAL: 168 MS
EKG Q-T INTERVAL: 416 MS
EKG QRS DURATION: 62 MS
EKG QTC CALCULATION (BAZETT): 445 MS
EKG R AXIS: 18 DEGREES
EKG T AXIS: 103 DEGREES
EKG VENTRICULAR RATE: 69 BPM
EOSINOPHILS ABSOLUTE: 0 K/UL (ref 0–0.6)
EOSINOPHILS RELATIVE PERCENT: 0.1 %
EPITHELIAL CELLS, UA: ABNORMAL /HPF (ref 0–5)
GFR AFRICAN AMERICAN: 51
GFR NON-AFRICAN AMERICAN: 42
GLOBULIN: 4.1 G/DL
GLUCOSE BLD-MCNC: 124 MG/DL (ref 70–99)
GLUCOSE URINE: NEGATIVE MG/DL
HCO3 VENOUS: 28.3 MMOL/L (ref 24–28)
HCT VFR BLD CALC: 45.9 % (ref 36–48)
HEMOGLOBIN, VEN, REDUCED: 39.8 %
HEMOGLOBIN: 15.5 G/DL (ref 12–16)
KETONES, URINE: NEGATIVE MG/DL
LEUKOCYTE ESTERASE, URINE: ABNORMAL
LYMPHOCYTES ABSOLUTE: 1.8 K/UL (ref 1–5.1)
LYMPHOCYTES RELATIVE PERCENT: 12.7 %
MCH RBC QN AUTO: 31.5 PG (ref 26–34)
MCHC RBC AUTO-ENTMCNC: 33.8 G/DL (ref 31–36)
MCV RBC AUTO: 93 FL (ref 80–100)
METHEMOGLOBIN VENOUS: 0.3 % (ref 0–1.5)
MICROSCOPIC EXAMINATION: YES
MONOCYTES ABSOLUTE: 1.1 K/UL (ref 0–1.3)
MONOCYTES RELATIVE PERCENT: 7.5 %
NEUTROPHILS ABSOLUTE: 11.1 K/UL (ref 1.7–7.7)
NEUTROPHILS RELATIVE PERCENT: 79.5 %
NITRITE, URINE: NEGATIVE
O2 SAT, VEN: 60 %
PCO2, VEN: 53.3 MMHG (ref 41–51)
PDW BLD-RTO: 14.3 % (ref 12.4–15.4)
PH UA: 5.5 (ref 5–8)
PH VENOUS: 7.33 (ref 7.35–7.45)
PLATELET # BLD: 246 K/UL (ref 135–450)
PMV BLD AUTO: 8.3 FL (ref 5–10.5)
PO2, VEN: 32.7 MMHG (ref 25–40)
POTASSIUM REFLEX MAGNESIUM: 4.6 MMOL/L (ref 3.5–5.1)
PRO-BNP: 385 PG/ML (ref 0–449)
PROTEIN UA: NEGATIVE MG/DL
RAPID INFLUENZA  B AGN: NEGATIVE
RAPID INFLUENZA A AGN: NEGATIVE
RBC # BLD: 4.94 M/UL (ref 4–5.2)
RBC UA: ABNORMAL /HPF (ref 0–4)
SARS-COV-2, NAAT: NOT DETECTED
SODIUM BLD-SCNC: 138 MMOL/L (ref 136–145)
SPECIFIC GRAVITY UA: 1.02 (ref 1–1.03)
TCO2 CALC VENOUS: 30 MMOL/L
TOTAL PROTEIN: 7.8 G/DL (ref 6.4–8.2)
TROPONIN: <0.01 NG/ML
URINE TYPE: ABNORMAL
UROBILINOGEN, URINE: 0.2 E.U./DL
WBC # BLD: 13.9 K/UL (ref 4–11)
WBC UA: ABNORMAL /HPF (ref 0–5)

## 2021-03-19 PROCEDURE — 74177 CT ABD & PELVIS W/CONTRAST: CPT

## 2021-03-19 PROCEDURE — 87086 URINE CULTURE/COLONY COUNT: CPT

## 2021-03-19 PROCEDURE — 82803 BLOOD GASES ANY COMBINATION: CPT

## 2021-03-19 PROCEDURE — 2580000003 HC RX 258: Performed by: EMERGENCY MEDICINE

## 2021-03-19 PROCEDURE — 93005 ELECTROCARDIOGRAM TRACING: CPT | Performed by: EMERGENCY MEDICINE

## 2021-03-19 PROCEDURE — 87635 SARS-COV-2 COVID-19 AMP PRB: CPT

## 2021-03-19 PROCEDURE — 84484 ASSAY OF TROPONIN QUANT: CPT

## 2021-03-19 PROCEDURE — 87804 INFLUENZA ASSAY W/OPTIC: CPT

## 2021-03-19 PROCEDURE — 70450 CT HEAD/BRAIN W/O DYE: CPT

## 2021-03-19 PROCEDURE — 6360000004 HC RX CONTRAST MEDICATION: Performed by: EMERGENCY MEDICINE

## 2021-03-19 PROCEDURE — 83880 ASSAY OF NATRIURETIC PEPTIDE: CPT

## 2021-03-19 PROCEDURE — 71045 X-RAY EXAM CHEST 1 VIEW: CPT

## 2021-03-19 PROCEDURE — U0003 INFECTIOUS AGENT DETECTION BY NUCLEIC ACID (DNA OR RNA); SEVERE ACUTE RESPIRATORY SYNDROME CORONAVIRUS 2 (SARS-COV-2) (CORONAVIRUS DISEASE [COVID-19]), AMPLIFIED PROBE TECHNIQUE, MAKING USE OF HIGH THROUGHPUT TECHNOLOGIES AS DESCRIBED BY CMS-2020-01-R: HCPCS

## 2021-03-19 PROCEDURE — 80053 COMPREHEN METABOLIC PANEL: CPT

## 2021-03-19 PROCEDURE — 85025 COMPLETE CBC W/AUTO DIFF WBC: CPT

## 2021-03-19 PROCEDURE — 84443 ASSAY THYROID STIM HORMONE: CPT

## 2021-03-19 PROCEDURE — 81001 URINALYSIS AUTO W/SCOPE: CPT

## 2021-03-19 PROCEDURE — 84439 ASSAY OF FREE THYROXINE: CPT

## 2021-03-19 PROCEDURE — 99284 EMERGENCY DEPT VISIT MOD MDM: CPT

## 2021-03-19 RX ORDER — ONDANSETRON 4 MG/1
4 TABLET, FILM COATED ORAL EVERY 8 HOURS PRN
Qty: 20 TABLET | Refills: 0 | Status: SHIPPED | OUTPATIENT
Start: 2021-03-19

## 2021-03-19 RX ORDER — SODIUM CHLORIDE, SODIUM LACTATE, POTASSIUM CHLORIDE, AND CALCIUM CHLORIDE .6; .31; .03; .02 G/100ML; G/100ML; G/100ML; G/100ML
1000 INJECTION, SOLUTION INTRAVENOUS ONCE
Status: COMPLETED | OUTPATIENT
Start: 2021-03-19 | End: 2021-03-19

## 2021-03-19 RX ORDER — SODIUM CHLORIDE 0.9 % (FLUSH) 0.9 %
10 SYRINGE (ML) INJECTION PRN
Status: DISCONTINUED | OUTPATIENT
Start: 2021-03-19 | End: 2021-03-19 | Stop reason: HOSPADM

## 2021-03-19 RX ADMIN — SODIUM CHLORIDE, POTASSIUM CHLORIDE, SODIUM LACTATE AND CALCIUM CHLORIDE 1000 ML: 600; 310; 30; 20 INJECTION, SOLUTION INTRAVENOUS at 14:18

## 2021-03-19 RX ADMIN — IOPAMIDOL 80 ML: 755 INJECTION, SOLUTION INTRAVENOUS at 14:05

## 2021-03-19 NOTE — ED PROVIDER NOTES
[id number]          ATTENDING PHYSICIAN NOTE       Date of evaluation: 3/19/2021    Chief Complaint     Emesis and Diarrhea      History of Present Illness     Ele Mcdaniel is a 80 y.o. female who presents to the hospital after being found down in bed. Patient is unable to provide any additional history secondary to dementia. Patient's daughter states patient has not been getting out of bed for several days now. Daughter goes over to her house regularly to check on her but feels like patient is unsafe to be at home. She has a history of dementia, she is not able to perform her ADLs. Patient's PCP has echoed the same sentiments, patient is not safe for home. Patient however is very stubborn and has initially refused to be moved into skilled nursing facility    She denied any pain at this time. Review of Systems     Review of Systems   Unable to perform ROS: Dementia       Past Medical, Surgical, Family, and Social History     She has a past medical history of Hypertension and Thyroid disease. She has a past surgical history that includes other surgical history (12/4/14). Her family history is not on file. She reports that she has quit smoking. Her smoking use included cigarettes. She quit after 10.00 years of use. She has never used smokeless tobacco. She reports current alcohol use of about 1.0 standard drinks of alcohol per week. Medications     Discharge Medication List as of 3/19/2021  5:59 PM      CONTINUE these medications which have NOT CHANGED    Details   donepezil (ARICEPT) 5 MG tablet Take 5 mg by mouth nightlyHistorical Med      folic acid (FOLVITE) 1 MG tablet Take 1 tablet by mouth daily, Disp-30 tablet, R-3Print      levothyroxine (SYNTHROID) 25 MCG tablet Take 1 tablet by mouth Daily, Disp-30 tablet, R-3Print      esomeprazole Magnesium (NEXIUM) 40 MG PACK Take 1 packet by mouth 2 times daily. , Disp-30 packet, R-3      lisinopril (PRINIVIL;ZESTRIL) 20 MG tablet Take 20 mg by mouth daily. Allergies     She is allergic to cephalosporins. Physical Exam     INITIAL VITALS: BP: (!) 158/80,  , Pulse: 74, Resp: 30, SpO2: 99 %   Physical Exam  Vitals signs reviewed. Constitutional:       Appearance: Normal appearance. She is normal weight. Comments: Disheveled, nontoxic   HENT:      Head: Normocephalic and atraumatic. Mouth/Throat:      Mouth: Mucous membranes are dry. Eyes:      Extraocular Movements: Extraocular movements intact. Conjunctiva/sclera: Conjunctivae normal.      Pupils: Pupils are equal, round, and reactive to light. Neck:      Musculoskeletal: Normal range of motion and neck supple. No muscular tenderness. Cardiovascular:      Rate and Rhythm: Normal rate and regular rhythm. Pulses: Normal pulses. Heart sounds: Normal heart sounds. Pulmonary:      Effort: Pulmonary effort is normal. No respiratory distress. Breath sounds: Normal breath sounds. No stridor. No wheezing, rhonchi or rales. Chest:      Chest wall: No tenderness. Abdominal:      General: Abdomen is flat. There is no distension. Palpations: Abdomen is soft. Tenderness: There is no abdominal tenderness. Musculoskeletal: Normal range of motion. General: No swelling or deformity. Comments: NICOL, no edema or deformity    Skin:     General: Skin is warm and dry. Capillary Refill: Capillary refill takes less than 2 seconds. Findings: No bruising. Neurological:      Mental Status: She is alert. She is disoriented. Comments: Oriented to person and place, disoriented to time and situation          Diagnostic Results         RADIOLOGY:  CT ABDOMEN PELVIS W IV CONTRAST Additional Contrast? None   Final Result      No acute intra-abdominal process. Diverticulosis. Fibrotic changes in the lung bases. Remote very mild compression deformity of the superior endplate of L1. CT HEAD WO CONTRAST   Final Result      No acute intracranial process. Chronic small vessel ischemic changes. XR CHEST PORTABLE   Final Result      Increased interstitial opacities throughout both lungs, potentially secondary to mild volume overload/interstitial edema or potentially atypical/viral infection with supporting clinical history.           LABS:   Results for orders placed or performed during the hospital encounter of 03/19/21   COVID-19, Rapid    Specimen: Nasopharyngeal Swab   Result Value Ref Range    SARS-CoV-2, NAAT Not Detected Not Detected   Rapid influenza A/B antigens    Specimen: Nasopharyngeal   Result Value Ref Range    Rapid Influenza A Ag Negative Negative    Rapid Influenza B Ag Negative Negative   Culture, Urine    Specimen: Urine, clean catch   Result Value Ref Range    Urine Culture, Routine No growth at 18 to 36 hours    CBC Auto Differential   Result Value Ref Range    WBC 13.9 (H) 4.0 - 11.0 K/uL    RBC 4.94 4.00 - 5.20 M/uL    Hemoglobin 15.5 12.0 - 16.0 g/dL    Hematocrit 45.9 36.0 - 48.0 %    MCV 93.0 80.0 - 100.0 fL    MCH 31.5 26.0 - 34.0 pg    MCHC 33.8 31.0 - 36.0 g/dL    RDW 14.3 12.4 - 15.4 %    Platelets 227 215 - 838 K/uL    MPV 8.3 5.0 - 10.5 fL    Neutrophils % 79.5 %    Lymphocytes % 12.7 %    Monocytes % 7.5 %    Eosinophils % 0.1 %    Basophils % 0.2 %    Neutrophils Absolute 11.1 (H) 1.7 - 7.7 K/uL    Lymphocytes Absolute 1.8 1.0 - 5.1 K/uL    Monocytes Absolute 1.1 0.0 - 1.3 K/uL    Eosinophils Absolute 0.0 0.0 - 0.6 K/uL    Basophils Absolute 0.0 0.0 - 0.2 K/uL   Comprehensive Metabolic Panel w/ Reflex to MG   Result Value Ref Range    Sodium 138 136 - 145 mmol/L    Potassium reflex Magnesium 4.6 3.5 - 5.1 mmol/L    Chloride 101 99 - 110 mmol/L    CO2 25 21 - 32 mmol/L    Anion Gap 12 3 - 16    Glucose 124 (H) 70 - 99 mg/dL    BUN 21 (H) 7 - 20 mg/dL    CREATININE 1.2 0.6 - 1.2 mg/dL    GFR Non-African American 42 (A) >60    GFR  51 (A) >60    Calcium 9.0 8.3 - 10.6 mg/dL    Total Protein 7.8 6.4 - 8.2 g/dL    Albumin 3.7 3.4 - 5.0 g/dL    Albumin/Globulin Ratio 0.9 (L) 1.1 - 2.2    Total Bilirubin 0.7 0.0 - 1.0 mg/dL    Alkaline Phosphatase 66 40 - 129 U/L    ALT 9 (L) 10 - 40 U/L    AST 21 15 - 37 U/L    Globulin 4.1 g/dL   Urinalysis, reflex to microscopic (Lab)   Result Value Ref Range    Color, UA Yellow Straw/Yellow    Clarity, UA Clear Clear    Glucose, Ur Negative Negative mg/dL    Bilirubin Urine Negative Negative    Ketones, Urine Negative Negative mg/dL    Specific Gravity, UA 1.025 1.005 - 1.030    Blood, Urine TRACE-INTACT (A) Negative    pH, UA 5.5 5.0 - 8.0    Protein, UA Negative Negative mg/dL    Urobilinogen, Urine 0.2 <2.0 E.U./dL    Nitrite, Urine Negative Negative    Leukocyte Esterase, Urine SMALL (A) Negative    Microscopic Examination YES     Urine Type Velasquez    TSH with Reflex   Result Value Ref Range    TSH 6.24 (H) 0.27 - 4.20 uIU/mL   Brain Natriuretic Peptide   Result Value Ref Range    Pro- 0 - 449 pg/mL   Troponin (lab)   Result Value Ref Range    Troponin <0.01 <0.01 ng/mL   Blood gas, venous (Lab)   Result Value Ref Range    pH, Pasha 7.334 (L) 7.350 - 7.450    pCO2, Pasha 53.3 (H) 41.0 - 51.0 mmHg    pO2, Pasha 32.7 25 - 40 mmHg    HCO3, Venous 28.3 (H) 24.0 - 28.0 mmol/L    Base Excess, Pasha 1.3 -2.0 - 3.0 mmol/L    O2 Sat, Pasha 60 Not established %    Carboxyhemoglobin 1.0 0.0 - 1.5 %    MetHgb, Pasha 0.3 0.0 - 1.5 %    TC02 (Calc), Pasha 30 mmol/L    Hemoglobin, Pasha, Reduced 39.80 %   Microscopic Urinalysis   Result Value Ref Range    WBC, UA 3-5 0 - 5 /HPF    RBC, UA 0-2 0 - 4 /HPF    Epithelial Cells, UA 11-20 (A) 0 - 5 /HPF    Bacteria, UA Rare (A) None Seen /HPF   COVID-19   Result Value Ref Range    SARS-CoV-2, PCR Not Detected Not Detected   T4, Free   Result Value Ref Range    T4 Free 1.2 0.9 - 1.8 ng/dL   EKG 12 Lead   Result Value Ref Range    Ventricular Rate 69 BPM    Atrial Rate 69 BPM    P-R Interval 168 ms    QRS Duration 62 ms    Q-T Interval 416 ms    QTc Calculation (Bazett) 445 ms    P Axis 51 degrees    R Axis 18 degrees    T Axis 103 degrees    Diagnosis       EKG performed in ER and to be interpreted by ER physician. Confirmed by MD, ER ([id number]),  Debo Tam ([phone]) on 3/19/2021 2:24:25 PM         RECENT VITALS:  BP: (!) 168/82, , Pulse: 72, Resp: 16, SpO2: (!) 88 %     Procedures     none    ED Course     Nursing Notes, Past Medical Hx, Past Surgical Hx, Social Hx,Allergies, and Family Hx were reviewed. patient was given the following medications:  Orders Placed This Encounter   Medications    DISCONTD: sodium chloride flush 0.9 % injection 10 mL    lactated ringers bolus    iopamidol (ISOVUE-370) 76 % injection 80 mL    ondansetron (ZOFRAN) 4 MG tablet     Sig: Take 1 tablet by mouth every 8 hours as needed for Nausea     Dispense:  20 tablet     Refill:  0       CONSULTS:  None    MEDICAL DECISIONMAKING / ASSESSMENT / PLAN     Mikey Root is a 80 y.o. female presents to the emergency department after being found down in her bed. Patient lives at home by herself and her daughter checks on her regularly. Patient has a history of dementia, she is unsure why she is in the emergency department, unable to provide any additional history. Per daughter at bedside patient has been unsafe to live by herself at home for some time now. Her PCP has also said the same thing, is concerned patient should not be living home by herself. Patient is very stubborn however and does not want reside in a skilled nursing facility. On ED arrival patient was pleasantly demented but appears unkept. Broad work-up was initiated. Patient noted nausea, vomiting, and diarrhea for the last several days. Also noted dehydration. Labs showed mild leukocytosis with left shift, normal electrolytes and LFTs.   Given her age, GI complaints, and leukocytosis, a CT abdomen pelvis was obtained which showed no acute intra-abdominal abnormalities, showed diverticulosis but no signs of diverticulitis. Patient was given IV fluids. Urine analysis showed trace leukocyte Estrace with WBCs but contaminated with epithelial cells, no clear urinary tract infection. Throughout the patient's ED visit I was discussing the patient's case with [facility] our . Patient's daughter was also actively involved. We discussed transferring the patient directly to a skilled nursing facility vs admission. At this time patient does not require inpatient admission. We are unable to transfer the patient directly to SNF. Patient's daughter will stay with the patient for several days and they will help arrange outpatient transfer to skilled nursing facility. Daughter was agreeable to this discharge plan. Patient is back to her baseline, able to tolerate p.o. in the emergency department. Vital signs are stable and reassuring. Family was agreeable to this plan. Clinical Impression     1. Nausea vomiting and diarrhea    2. Dehydration        Disposition     PATIENT REFERRED TO:  Karen Resendiz  [address] [place] [address]   [address] [place] [address]    Schedule an appointment as soon as possible for a visit in 1 week        DISCHARGE MEDICATIONS:  Discharge Medication List as of 3/19/2021  5:59 PM      START taking these medications    Details   ondansetron (ZOFRAN) 4 MG tablet Take 1 tablet by mouth every 8 hours as needed for Nausea, Disp-20 tablet, R-0Print             DISPOSITION Decision To Discharge 03/19/2021 06:02:48 PM          Kisha Griffith MD  03/29/21 2011

## 2021-03-19 NOTE — CARE COORDINATION
Referred to patient for d/c planning. Spoke to patient and daughter. Patient is a 80year old female. Patient has weakness, fatigue, nausea and vomitting the last week. Patient's daughter checks on her daily. Patient has been refusing to take a shower or do her ADLs. She has been incontinent and will not clean up afterwards. She has not been eating or drinking. Daughter is interested in patient going to [facility] on d/c from the hospital.  Daughter has been trying to get patient to agree to Assisted Living for several months and patient has refused in past.  Patient is currently agreeable. Referral made to [facility]. MD and RN updated. UPDATE: unable to receive a return call from [facility]. Discussed SNF vs. Home until [facility] can be arranged. Daughter prefers to take patient home if patient is ambulatory. Daughter reports she is off until Wednesday and can assist patient. Also provided private duty information. RN and MD updated.  Electronically signed by AB Dyer LISW-S on 3/19/2021 at 3:22 PM

## 2021-03-19 NOTE — ED NOTES
Pt's daughter reports that the patient has been sick for about a week, with N/V/D. The patient in unable to get out of bed and is unable to perform ADL's. EMS reports that the house smells like urine and the patient has vomited all over the bathroom last night. The patients daughter states that the patient will not be able to return to her home, unless she is able to care for herself. The patient lives alone, and the daughter and granddaughter check on her daily.  contacted.      Vaishnavi Craft RN  03/19/21 [id number]

## 2021-03-19 NOTE — ED NOTES
Spoke with [facility]. Background on patient provided then gave to phone to daughter to set up appointment for intake.      Andrew Davis RN  03/19/21 2009

## 2021-03-20 ENCOUNTER — CARE COORDINATION (OUTPATIENT)
Dept: CASE MANAGEMENT | Age: 86
End: 2021-03-20

## 2021-03-20 LAB
SARS-COV-2, PCR: NOT DETECTED
T4 FREE: 1.2 NG/DL (ref 0.9–1.8)
TSH REFLEX: 6.24 UIU/ML (ref 0.27–4.2)
URINE CULTURE, ROUTINE: NORMAL

## 2021-03-22 ENCOUNTER — CARE COORDINATION (OUTPATIENT)
Dept: CASE MANAGEMENT | Age: 86
End: 2021-03-22

## 2021-03-22 NOTE — CARE COORDINATION
Ambulatory Care Manager contacted the patient by telephone to perform post discharge assessment. Call within 2 business days of discharge: No. Verified name and  with patient as identifiers. Provided introduction to self, and explanation of the ACM role, and reason for call due to risk factors for infection and/or exposure to COVID-19. Patient contacted regarding [facility]. Discussed COVID-19 related testing which was available at this time. Test results were negative. Patient informed of results, if available? Yes  Both rapid and PCR COVID-19 test negative      Symptoms reviewed with patient who verbalized the following symptoms: no new symptoms, no worsening symptoms and fatigue but reports this is much better and N/V and diarrhea resolved. Due to no new or worsening symptoms encounter was not routed to provider for escalation. Discussed follow-up appointments. If no appointment was previously scheduled, appointment scheduling offered: patient agrees to /[facility] follow up appointment(s): No future appointments. Non-[facility] follow up appointment(s):     Non-face-to-face services provided:  Obtained and reviewed discharge summary and/or continuity of care documents     Patient has following risk factors of: pneumonia and HTN and Age. ACM reviewed discharge instructions, medical action plan and red flags such as increased shortness of breath, increasing fever and signs of decompensation with patient who verbalized understanding. Discussed exposure protocols and quarantine with CDC Guidelines \"How to Protect Yourself and Others from COVID-19. \"  and \"What Happens if I Have a Negative Test. Patient was given an opportunity for questions and concerns. The patient agrees to contact the [facility] exposure line [phone], local health department  and PCP office for questions related to their healthcare. ACM provided contact information for future needs.   Discussed vaccines; patient reports she has received the first injection and her daughter/granddaughter will take her for her next appointment  Discussed f/u with [facility]; patient reports she is not interested. Discussed Senior Services and patient reports she is familiar with these services but denies need. Patient reports her daughter and granddaughter help her with all of her needs. Patient declined ACM contacting her caregivers  Reviewed and educated patient on any new and changed medications related to discharge diagnosis     Was patient discharged with a pulse oximeter?  No    Patient/family/caregiver given information for [facility] and patient declines      Patient declines f/u calls   No further outreach scheduled with this ACM; episode of care resolved

## 2021-06-30 ENCOUNTER — APPOINTMENT (OUTPATIENT)
Dept: GENERAL RADIOLOGY | Age: 86
End: 2021-06-30
Payer: MEDICARE

## 2021-06-30 ENCOUNTER — HOSPITAL ENCOUNTER (EMERGENCY)
Age: 86
Discharge: HOME | End: 2021-06-30
Attending: EMERGENCY MEDICINE
Payer: MEDICARE

## 2021-06-30 VITALS
TEMPERATURE: 97.9 F | DIASTOLIC BLOOD PRESSURE: 97 MMHG | HEART RATE: 71 BPM | HEIGHT: 61 IN | SYSTOLIC BLOOD PRESSURE: 164 MMHG | RESPIRATION RATE: 21 BRPM | OXYGEN SATURATION: 100 % | BODY MASS INDEX: 23.6 KG/M2 | WEIGHT: 125 LBS

## 2021-06-30 DIAGNOSIS — J40 BRONCHITIS: ICD-10-CM

## 2021-06-30 DIAGNOSIS — R06.02 SHORTNESS OF BREATH: Primary | ICD-10-CM

## 2021-06-30 DIAGNOSIS — N30.00 ACUTE CYSTITIS WITHOUT HEMATURIA: ICD-10-CM

## 2021-06-30 LAB
ANION GAP SERPL CALCULATED.3IONS-SCNC: 9 MMOL/L (ref 3–16)
BACTERIA: ABNORMAL /HPF
BASE EXCESS VENOUS: 4.1 MMOL/L (ref -2–3)
BASOPHILS ABSOLUTE: 0 K/UL (ref 0–0.2)
BASOPHILS RELATIVE PERCENT: 0.5 %
BILIRUBIN URINE: NEGATIVE
BLOOD, URINE: NEGATIVE
BUN BLDV-MCNC: 24 MG/DL (ref 7–20)
CALCIUM SERPL-MCNC: 9.1 MG/DL (ref 8.3–10.6)
CARBOXYHEMOGLOBIN: 1.1 % (ref 0–1.5)
CHLORIDE BLD-SCNC: 101 MMOL/L (ref 99–110)
CLARITY: CLEAR
CO2: 26 MMOL/L (ref 21–32)
COLOR: YELLOW
CREAT SERPL-MCNC: 1.3 MG/DL (ref 0.6–1.2)
EKG ATRIAL RATE: 60 BPM
EKG DIAGNOSIS: NORMAL
EKG P AXIS: 47 DEGREES
EKG P-R INTERVAL: 170 MS
EKG Q-T INTERVAL: 426 MS
EKG QRS DURATION: 62 MS
EKG QTC CALCULATION (BAZETT): 426 MS
EKG R AXIS: 38 DEGREES
EKG T AXIS: 85 DEGREES
EKG VENTRICULAR RATE: 60 BPM
EOSINOPHILS ABSOLUTE: 0.2 K/UL (ref 0–0.6)
EOSINOPHILS RELATIVE PERCENT: 2.5 %
EPITHELIAL CELLS, UA: ABNORMAL /HPF (ref 0–5)
GFR AFRICAN AMERICAN: 46
GFR NON-AFRICAN AMERICAN: 38
GLUCOSE BLD-MCNC: 106 MG/DL (ref 70–99)
GLUCOSE URINE: NEGATIVE MG/DL
HCO3 VENOUS: 30.9 MMOL/L (ref 24–28)
HCT VFR BLD CALC: 43.9 % (ref 36–48)
HEMOGLOBIN, VEN, REDUCED: 84.3 %
HEMOGLOBIN: 14.8 G/DL (ref 12–16)
KETONES, URINE: NEGATIVE MG/DL
LACTIC ACID: 1.7 MMOL/L (ref 0.4–2)
LEUKOCYTE ESTERASE, URINE: ABNORMAL
LYMPHOCYTES ABSOLUTE: 2.1 K/UL (ref 1–5.1)
LYMPHOCYTES RELATIVE PERCENT: 26.6 %
MCH RBC QN AUTO: 31.2 PG (ref 26–34)
MCHC RBC AUTO-ENTMCNC: 33.8 G/DL (ref 31–36)
MCV RBC AUTO: 92.5 FL (ref 80–100)
METHEMOGLOBIN VENOUS: 0.2 % (ref 0–1.5)
MICROSCOPIC EXAMINATION: YES
MONOCYTES ABSOLUTE: 0.7 K/UL (ref 0–1.3)
MONOCYTES RELATIVE PERCENT: 8.3 %
NEUTROPHILS ABSOLUTE: 5 K/UL (ref 1.7–7.7)
NEUTROPHILS RELATIVE PERCENT: 62.1 %
NITRITE, URINE: NEGATIVE
O2 SAT, VEN: 15 %
PCO2, VEN: 53.3 MMHG (ref 41–51)
PDW BLD-RTO: 15.1 % (ref 12.4–15.4)
PH UA: 6 (ref 5–8)
PH VENOUS: 7.37 (ref 7.35–7.45)
PLATELET # BLD: 298 K/UL (ref 135–450)
PMV BLD AUTO: 8.9 FL (ref 5–10.5)
PO2, VEN: <30 MMHG (ref 25–40)
POTASSIUM REFLEX MAGNESIUM: 4.7 MMOL/L (ref 3.5–5.1)
PRO-BNP: 668 PG/ML (ref 0–449)
PROTEIN UA: NEGATIVE MG/DL
RBC # BLD: 4.75 M/UL (ref 4–5.2)
RBC UA: ABNORMAL /HPF (ref 0–4)
SODIUM BLD-SCNC: 136 MMOL/L (ref 136–145)
SPECIFIC GRAVITY UA: 1.02 (ref 1–1.03)
TCO2 CALC VENOUS: 33 MMOL/L
TROPONIN: <0.01 NG/ML
URINE TYPE: ABNORMAL
UROBILINOGEN, URINE: 0.2 E.U./DL
WBC # BLD: 8.1 K/UL (ref 4–11)
WBC UA: ABNORMAL /HPF (ref 0–5)

## 2021-06-30 PROCEDURE — 80048 BASIC METABOLIC PNL TOTAL CA: CPT

## 2021-06-30 PROCEDURE — 82803 BLOOD GASES ANY COMBINATION: CPT

## 2021-06-30 PROCEDURE — 6370000000 HC RX 637 (ALT 250 FOR IP): Performed by: EMERGENCY MEDICINE

## 2021-06-30 PROCEDURE — 85025 COMPLETE CBC W/AUTO DIFF WBC: CPT

## 2021-06-30 PROCEDURE — 6360000002 HC RX W HCPCS: Performed by: EMERGENCY MEDICINE

## 2021-06-30 PROCEDURE — 83880 ASSAY OF NATRIURETIC PEPTIDE: CPT

## 2021-06-30 PROCEDURE — 94640 AIRWAY INHALATION TREATMENT: CPT

## 2021-06-30 PROCEDURE — 94761 N-INVAS EAR/PLS OXIMETRY MLT: CPT

## 2021-06-30 PROCEDURE — 83605 ASSAY OF LACTIC ACID: CPT

## 2021-06-30 PROCEDURE — 87086 URINE CULTURE/COLONY COUNT: CPT

## 2021-06-30 PROCEDURE — 93005 ELECTROCARDIOGRAM TRACING: CPT | Performed by: EMERGENCY MEDICINE

## 2021-06-30 PROCEDURE — 81001 URINALYSIS AUTO W/SCOPE: CPT

## 2021-06-30 PROCEDURE — 84484 ASSAY OF TROPONIN QUANT: CPT

## 2021-06-30 PROCEDURE — 36415 COLL VENOUS BLD VENIPUNCTURE: CPT

## 2021-06-30 PROCEDURE — 99284 EMERGENCY DEPT VISIT MOD MDM: CPT

## 2021-06-30 PROCEDURE — 71046 X-RAY EXAM CHEST 2 VIEWS: CPT

## 2021-06-30 RX ORDER — ALBUTEROL SULFATE 2.5 MG/3ML
2.5 SOLUTION RESPIRATORY (INHALATION) EVERY 6 HOURS PRN
Status: DISCONTINUED | OUTPATIENT
Start: 2021-06-30 | End: 2021-06-30 | Stop reason: HOSPADM

## 2021-06-30 RX ORDER — SULFAMETHOXAZOLE AND TRIMETHOPRIM 800; 160 MG/1; MG/1
1 TABLET ORAL 2 TIMES DAILY
Qty: 6 TABLET | Refills: 0 | Status: SHIPPED | OUTPATIENT
Start: 2021-06-30 | End: 2021-07-03

## 2021-06-30 RX ORDER — ALBUTEROL SULFATE 90 UG/1
4 AEROSOL, METERED RESPIRATORY (INHALATION) EVERY 4 HOURS PRN
Qty: 1 INHALER | Refills: 0 | Status: SHIPPED | OUTPATIENT
Start: 2021-06-30

## 2021-06-30 RX ORDER — ALBUTEROL SULFATE 2.5 MG/3ML
2.5 SOLUTION RESPIRATORY (INHALATION) ONCE
Status: DISCONTINUED | OUTPATIENT
Start: 2021-06-30 | End: 2021-06-30 | Stop reason: HOSPADM

## 2021-06-30 RX ORDER — ALBUTEROL SULFATE 90 UG/1
2 AEROSOL, METERED RESPIRATORY (INHALATION) ONCE
Status: COMPLETED | OUTPATIENT
Start: 2021-06-30 | End: 2021-06-30

## 2021-06-30 RX ORDER — NITROFURANTOIN 25; 75 MG/1; MG/1
100 CAPSULE ORAL ONCE
Status: COMPLETED | OUTPATIENT
Start: 2021-06-30 | End: 2021-06-30

## 2021-06-30 RX ORDER — AZITHROMYCIN 250 MG/1
250 TABLET, FILM COATED ORAL SEE ADMIN INSTRUCTIONS
Qty: 6 TABLET | Refills: 0 | Status: SHIPPED | OUTPATIENT
Start: 2021-06-30 | End: 2021-07-05

## 2021-06-30 RX ADMIN — ALBUTEROL SULFATE 2.5 MG: 2.5 SOLUTION RESPIRATORY (INHALATION) at 17:54

## 2021-06-30 RX ADMIN — NITROFURANTOIN MONOHYDRATE/MACROCRYSTALLINE 100 MG: 25; 75 CAPSULE ORAL at 20:12

## 2021-06-30 RX ADMIN — ALBUTEROL SULFATE 2 PUFF: 90 AEROSOL, METERED RESPIRATORY (INHALATION) at 19:54

## 2021-06-30 NOTE — ED NOTES
Bed: A07-07  Expected date:   Expected time:   Means of arrival:   Comments:  Dasia Cottrell RN  06/30/21 7472

## 2021-06-30 NOTE — ED PROVIDER NOTES
[address]          ATTENDING PHYSICIAN NOTE       Date of evaluation: 6/30/2021    Chief Complaint     Shortness of Breath (for a few weeks has had a cough and some SOB, woke up from nap today and asked daughter to take her to the hospital, -fever)      History of Present Illness     Porfirio Puentes is a 80 y.o. female who presents with complaint of shortness of breath. She reports is been ongoing for couple of weeks. The patient tells me that she does not feel more short of breath today, but says that her granddaughter called EMS with concern that she was short of breath. The patient denies any chest pain. She can think of no inciting or alleviating or aggravating factors for the shortness of breath. She feels less short of breath when sitting up as compared to lying down but does sleep at night laying down. She has had some cough and a feeling of mucus in her throat but says the cough is largely nonproductive otherwise. Denies any fevers. Denies any peripheral swelling. Review of Systems     Review of Systems  Pertinent positives and negatives are listed in the HPI; otherwise all systems are reviewed and were negative. Past Medical, Surgical, Family, and Social History     She has a past medical history of Hypertension and Thyroid disease. She has a past surgical history that includes other surgical history (12/4/14). Her family history is not on file. She reports that she has quit smoking. Her smoking use included cigarettes. She quit after 10.00 years of use. She has never used smokeless tobacco. She reports current alcohol use of about 1.0 standard drinks of alcohol per week.     Medications     Discharge Medication List as of 6/30/2021  8:08 PM      CONTINUE these medications which have NOT CHANGED    Details   ondansetron (ZOFRAN) 4 MG tablet Take 1 tablet by mouth every 8 hours as needed for Nausea, Disp-20 tablet, R-0Print      donepezil (ARICEPT) 5 MG tablet Take 5 mg by mouth nightlyHistorical Med      folic acid (FOLVITE) 1 MG tablet Take 1 tablet by mouth daily, Disp-30 tablet, R-3Print      levothyroxine (SYNTHROID) 25 MCG tablet Take 1 tablet by mouth Daily, Disp-30 tablet, R-3Print      esomeprazole Magnesium (NEXIUM) 40 MG PACK Take 1 packet by mouth 2 times daily. , Disp-30 packet, R-3      lisinopril (PRINIVIL;ZESTRIL) 20 MG tablet Take 20 mg by mouth daily. Allergies     She is allergic to cephalosporins. Physical Exam     INITIAL VITALS: BP: (!) 186/79, Temp: 97.9 °F (36.6 °C), Pulse: 62, Resp: 15, SpO2: 97 %   Physical Exam  Constitutional:       Appearance: She is well-developed. HENT:      Head: Normocephalic and atraumatic. Cardiovascular:      Rate and Rhythm: Normal rate and regular rhythm. Pulmonary:      Effort: Tachypnea present. No accessory muscle usage. Breath sounds: No stridor. No wheezing or rales. Chest:      Chest wall: No mass or edema. Abdominal:      Palpations: Abdomen is soft. Tenderness: There is no abdominal tenderness. Skin:     General: Skin is warm and dry. Neurological:      General: No focal deficit present. Mental Status: She is alert. Motor: No weakness. Diagnostic Results     EKG   Sinus rhythm, rate 60 bpm, DE interval 170, QTc 426, no acute ischemic changes, no pathologic ST elevation. RADIOLOGY:  XR CHEST (2 VW)   Final Result      Unchanged bilateral parenchymal disease.           LABS:   Results for orders placed or performed during the hospital encounter of 06/30/21   Culture, Urine    Specimen: Urine, clean catch   Result Value Ref Range    Urine Culture, Routine No growth at 18 to 36 hours    CBC Auto Differential   Result Value Ref Range    WBC 8.1 4.0 - 11.0 K/uL    RBC 4.75 4.00 - 5.20 M/uL    Hemoglobin 14.8 12.0 - 16.0 g/dL    Hematocrit 43.9 36.0 - 48.0 %    MCV 92.5 80.0 - 100.0 fL    MCH 31.2 26.0 - 34.0 pg    MCHC 33.8 31.0 - 36.0 g/dL    RDW 15.1 12.4 - 15.4 %    Platelets 386 233 - 366 K/uL    MPV 8.9 5.0 - 10.5 fL    Neutrophils % 62.1 %    Lymphocytes % 26.6 %    Monocytes % 8.3 %    Eosinophils % 2.5 %    Basophils % 0.5 %    Neutrophils Absolute 5.0 1.7 - 7.7 K/uL    Lymphocytes Absolute 2.1 1.0 - 5.1 K/uL    Monocytes Absolute 0.7 0.0 - 1.3 K/uL    Eosinophils Absolute 0.2 0.0 - 0.6 K/uL    Basophils Absolute 0.0 0.0 - 0.2 K/uL   Basic Metabolic Panel w/ Reflex to MG   Result Value Ref Range    Sodium 136 136 - 145 mmol/L    Potassium reflex Magnesium 4.7 3.5 - 5.1 mmol/L    Chloride 101 99 - 110 mmol/L    CO2 26 21 - 32 mmol/L    Anion Gap 9 3 - 16    Glucose 106 (H) 70 - 99 mg/dL    BUN 24 (H) 7 - 20 mg/dL    CREATININE 1.3 (H) 0.6 - 1.2 mg/dL    GFR Non- 38 (A) >60    GFR  46 (A) >60    Calcium 9.1 8.3 - 10.6 mg/dL   Troponin   Result Value Ref Range    Troponin <0.01 <0.01 ng/mL   Brain Natriuretic Peptide   Result Value Ref Range    Pro- (H) 0 - 449 pg/mL   Blood Gas, Venous   Result Value Ref Range    pH, Pasha 7.371 7.350 - 7.450    pCO2, Pasha 53.3 (H) 41.0 - 51.0 mmHg    pO2, Pasha <30.0 25 - 40 mmHg    HCO3, Venous 30.9 (H) 24.0 - 28.0 mmol/L    Base Excess, Pasha 4.1 (H) -2.0 - 3.0 mmol/L    O2 Sat, Pasha 15 Not established %    Carboxyhemoglobin 1.1 0.0 - 1.5 %    MetHgb, Pasha 0.2 0.0 - 1.5 %    TC02 (Calc), Pasha 33 mmol/L    Hemoglobin, Pasha, Reduced 84.30 %   Lactic Acid, Plasma   Result Value Ref Range    Lactic Acid 1.7 0.4 - 2.0 mmol/L   Urinalysis, reflex to microscopic   Result Value Ref Range    Color, UA Yellow Straw/Yellow    Clarity, UA Clear Clear    Glucose, Ur Negative Negative mg/dL    Bilirubin Urine Negative Negative    Ketones, Urine Negative Negative mg/dL    Specific Gravity, UA 1.020 1.005 - 1.030    Blood, Urine Negative Negative    pH, UA 6.0 5.0 - 8.0    Protein, UA Negative Negative mg/dL    Urobilinogen, Urine 0.2 <2.0 E.U./dL    Nitrite, Urine Negative Negative Leukocyte Esterase, Urine MODERATE (A) Negative    Microscopic Examination YES     Urine Type NotGiven    Microscopic Urinalysis   Result Value Ref Range    WBC, UA  (A) 0 - 5 /HPF    RBC, UA None seen 0 - 4 /HPF    Epithelial Cells, UA 11-20 (A) 0 - 5 /HPF    Bacteria, UA Rare (A) None Seen /HPF   EKG 12 Lead   Result Value Ref Range    Ventricular Rate 60 BPM    Atrial Rate 60 BPM    P-R Interval 170 ms    QRS Duration 62 ms    Q-T Interval 426 ms    QTc Calculation (Bazett) 426 ms    P Axis 47 degrees    R Axis 38 degrees    T Axis 85 degrees    Diagnosis       EKG performed in ER and to be interpreted by ER physician. Confirmed by MD, ER ([id number]),  Benjaminporsche Salbador ([phone]) on 6/30/2021 5:07:48 PM       ED BEDSIDE ULTRASOUND:      RECENT VITALS:  BP: (!) 164/97,Temp: 97.9 °F (36.6 °C), Pulse: 71, Resp: 21, SpO2: 100 %     Procedures         ED Course     Nursing Notes, Past Medical Hx, Past Surgical Hx, Social Hx,Allergies, and Family Hx were reviewed. patient was given the following medications:  Orders Placed This Encounter   Medications    DISCONTD: albuterol (PROVENTIL) nebulizer solution 2.5 mg     Order Specific Question:   Initiate RT Bronchodilator Protocol     Answer: Yes    nitrofurantoin (macrocrystal-monohydrate) (MACROBID) capsule 100 mg    DISCONTD: albuterol (PROVENTIL) nebulizer solution 2.5 mg     Order Specific Question:   Initiate RT Bronchodilator Protocol     Answer: Yes    albuterol sulfate  (90 Base) MCG/ACT inhaler 2 puff     Order Specific Question:   Initiate RT Bronchodilator Protocol     Answer:    Yes    azithromycin (ZITHROMAX) 250 MG tablet     Sig: Take 1 tablet by mouth See Admin Instructions for 5 days 500mg on day 1 followed by 250mg on days 2 - 5     Dispense:  6 tablet     Refill:  0    albuterol sulfate HFA (PROVENTIL HFA) 108 (90 Base) MCG/ACT inhaler     Sig: Inhale 4 puffs into the lungs every 4 hours as needed for Wheezing or Shortness of Breath daily for 3 days, Disp-6 tablet, R-0Print             DISPOSITION Decision To Discharge 06/30/2021 08:09:47 PM          Aron Marcum MD  07/05/21 5020

## 2021-06-30 NOTE — ED NOTES
Ambulated patient with pulse ox and walker. SpO2 remained above 93% during ambulation, HR increased to 108.       Nik Ocasio RN  06/30/21 3942

## 2021-07-01 LAB — URINE CULTURE, ROUTINE: NORMAL

## 2021-07-20 ENCOUNTER — HOSPITAL ENCOUNTER (EMERGENCY)
Age: 86
Discharge: OTHER INSTITUTION | End: 2021-07-21
Attending: EMERGENCY MEDICINE
Payer: MEDICARE
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
## 2021-07-20 DIAGNOSIS — F03.91 DEMENTIA WITH BEHAVIORAL DISTURBANCE, UNSPECIFIED DEMENTIA TYPE: Primary | ICD-10-CM

## 2021-07-20 DIAGNOSIS — T67.9XXA HEAT EXPOSURE, INITIAL ENCOUNTER: ICD-10-CM

## 2021-07-20 LAB
ANION GAP SERPL CALCULATED.3IONS-SCNC: 14 MMOL/L (ref 3–16)
BASOPHILS ABSOLUTE: 0.1 K/UL (ref 0–0.2)
BASOPHILS RELATIVE PERCENT: 0.6 %
BUN BLDV-MCNC: 25 MG/DL (ref 7–20)
CALCIUM SERPL-MCNC: 7.5 MG/DL (ref 8.3–10.6)
CHLORIDE BLD-SCNC: 105 MMOL/L (ref 99–110)
CO2: 20 MMOL/L (ref 21–32)
CREAT SERPL-MCNC: 1.3 MG/DL (ref 0.6–1.2)
EKG ATRIAL RATE: 106 BPM
EKG DIAGNOSIS: NORMAL
EKG P AXIS: 49 DEGREES
EKG P-R INTERVAL: 158 MS
EKG Q-T INTERVAL: 354 MS
EKG QRS DURATION: 66 MS
EKG QTC CALCULATION (BAZETT): 470 MS
EKG R AXIS: 15 DEGREES
EKG T AXIS: 54 DEGREES
EKG VENTRICULAR RATE: 106 BPM
EOSINOPHILS ABSOLUTE: 0.1 K/UL (ref 0–0.6)
EOSINOPHILS RELATIVE PERCENT: 1.4 %
GFR AFRICAN AMERICAN: 46
GFR NON-AFRICAN AMERICAN: 38
GLUCOSE BLD-MCNC: 107 MG/DL (ref 70–99)
GLUCOSE BLD-MCNC: 87 MG/DL (ref 70–99)
HCT VFR BLD CALC: 35.2 % (ref 36–48)
HEMOGLOBIN: 11.9 G/DL (ref 12–16)
LACTIC ACID: 1.4 MMOL/L (ref 0.4–2)
LYMPHOCYTES ABSOLUTE: 2.2 K/UL (ref 1–5.1)
LYMPHOCYTES RELATIVE PERCENT: 24.9 %
MAGNESIUM: 1.4 MG/DL (ref 1.8–2.4)
MCH RBC QN AUTO: 31.5 PG (ref 26–34)
MCHC RBC AUTO-ENTMCNC: 34 G/DL (ref 31–36)
MCV RBC AUTO: 92.9 FL (ref 80–100)
MONOCYTES ABSOLUTE: 1 K/UL (ref 0–1.3)
MONOCYTES RELATIVE PERCENT: 10.7 %
NEUTROPHILS ABSOLUTE: 5.6 K/UL (ref 1.7–7.7)
NEUTROPHILS RELATIVE PERCENT: 62.4 %
PDW BLD-RTO: 15.1 % (ref 12.4–15.4)
PERFORMED ON: NORMAL
PLATELET # BLD: 243 K/UL (ref 135–450)
PMV BLD AUTO: 8.2 FL (ref 5–10.5)
POTASSIUM REFLEX MAGNESIUM: 3.5 MMOL/L (ref 3.5–5.1)
RBC # BLD: 3.79 M/UL (ref 4–5.2)
SODIUM BLD-SCNC: 139 MMOL/L (ref 136–145)
TOTAL CK: 24 U/L (ref 26–192)
WBC # BLD: 9 K/UL (ref 4–11)

## 2021-07-20 PROCEDURE — 99285 EMERGENCY DEPT VISIT HI MDM: CPT

## 2021-07-20 PROCEDURE — 83605 ASSAY OF LACTIC ACID: CPT

## 2021-07-20 PROCEDURE — 93005 ELECTROCARDIOGRAM TRACING: CPT | Performed by: EMERGENCY MEDICINE

## 2021-07-20 PROCEDURE — 6360000002 HC RX W HCPCS: Performed by: EMERGENCY MEDICINE

## 2021-07-20 PROCEDURE — 2580000003 HC RX 258: Performed by: EMERGENCY MEDICINE

## 2021-07-20 PROCEDURE — 82550 ASSAY OF CK (CPK): CPT

## 2021-07-20 PROCEDURE — 85025 COMPLETE CBC W/AUTO DIFF WBC: CPT

## 2021-07-20 PROCEDURE — 83735 ASSAY OF MAGNESIUM: CPT

## 2021-07-20 PROCEDURE — 80048 BASIC METABOLIC PNL TOTAL CA: CPT

## 2021-07-20 PROCEDURE — 96365 THER/PROPH/DIAG IV INF INIT: CPT

## 2021-07-20 PROCEDURE — 6370000000 HC RX 637 (ALT 250 FOR IP): Performed by: EMERGENCY MEDICINE

## 2021-07-20 RX ORDER — LANOLIN ALCOHOL/MO/W.PET/CERES
400 CREAM (GRAM) TOPICAL ONCE
Status: COMPLETED | OUTPATIENT
Start: 2021-07-20 | End: 2021-07-20

## 2021-07-20 RX ADMIN — CALCIUM GLUCONATE 1000 MG: 98 INJECTION, SOLUTION INTRAVENOUS at 19:09

## 2021-07-20 RX ADMIN — Medication 400 MG: at 19:09

## 2021-07-20 NOTE — ED PROVIDER NOTES
[address] [facility]          ATTENDING PHYSICIAN NOTE       Date of evaluation: 7/20/2021    Chief Complaint     Fall (Slip and fall out of chair while sitting outside. Pt states she was unable to get up, denies LOC. Sitting outside for about an hour)      History of Present Illness     Janette Yanez is a 80 y.o. female with History of dementia, hypertension presenting to the emergency department today for heat exposure. Patient was found on the ground outside her home on her porch by her granddaughter. Patient states she was attempting to get up from a chair and thinks it tipped over. She otherwise felt well prior to this episode. Thinks she was on the ground for approximately 1 hour. She denies any pain or injury. Denies any symptoms currently. Blood glucose for EMS was in the 150s. Review of Systems     Pertinent positive and negative findings as documented in the HPI. Otherwise all other systems were reviewed and were negative. Physical Exam     INITIAL VITALS: BP: (!) 131/50, Temp: 98.5 °F (36.9 °C), Pulse: 101, Resp: 18, SpO2: 94 %     Nursing note and vitals reviewed. General:  Adult female, alert and appropriately interactive. In no distress. HENT: Normocephalic and atraumatic. External ears normal. Nose appears normal externally. Eyes: Conjunctivae normal. No scleral icterus. PERRL, EOMI, no nystagmus  Neck: Neck supple. No tracheal deviation present. CV: Tachycardic rate. Regular rhythm. S1/S2 auscultated. No murmurs, gallops or rubs. Pulm: Effort normal. Breath sounds clear to auscultation bilaterally. No wheezes. No rales or rhonchi. GI: Soft. No distension. No tenderness. No rebound or guarding. No masses. No peritoneal signs. Rectal exam with no masses, brown stool, Hemoccult negative. Musculoskeletal: No edema. No gross deformities. Pelvis stable, no bony tenderness of the extremities.   Neurological: Alert and appropriately interactive. Face symmetric, speech without dysarthria or obvious aphasia. Moving all extremities spontaneously. Skin: Warm, diaphoretic. No rash. No diaphoresis or erythema. Psychiatric: Calm and cooperative with appropriate mood and affect. Procedures   Procedures    MEDICAL DECISION MAKING     MDM: Naima Nixon is a 80 y.o. female with history of mild to moderate dementia presenting after being found down on her porch outside today. On arrival, patient is in no distress, is alert and appropriately interactive, vital signs with mild tachycardia. She is not hyperthermic. Her exam is without concerning findings. EKG without acute findings. Her labs are notable for slight decrease in hemoglobin, though approximately near the lower limit of her previous range. Rectal exam was with brown stool, Hemoccult negative. No other obvious sources of bleeding. She was given IV fluids and remained stable in the ED. Her tachycardia resolved. Her family notes that they do not feel she is safe at home alone currently. We do not have case management currently to consult to assist with placement. Urinalysis is pending at signout. Patient signed out to the oncoming physician, Dr. Kristin Blevins, who will follow up on urinalysis and determine the patient's ultimate disposition. Anticipate she will be stable for social work consultation in the morning for placement should her urinalysis be reassuring. Clinical Impression     1. Dementia with behavioral disturbance, unspecified dementia type ([facility] [address]    2. Fall from chair, initial encounter    3. Heat exposure, initial encounter        Disposition     DISPOSITION          Gunner Tran MD  9:56 PM                     Past Medical, Surgical, Family, and Social History     She has a past medical history of Hypertension and Thyroid disease. She has a past surgical history that includes other surgical history (12/4/14). Her family history is not on file.   She reports that she has quit smoking. Her smoking use included cigarettes. She quit after 10.00 years of use. She has never used smokeless tobacco. She reports current alcohol use of about 1.0 standard drinks of alcohol per week. Medications     Previous Medications    ALBUTEROL SULFATE HFA (PROVENTIL HFA) 108 (90 BASE) MCG/ACT INHALER    Inhale 4 puffs into the lungs every 4 hours as needed for Wheezing or Shortness of Breath (Space out to every 6 hours as symptoms improve) Space out to every 6 hours as symptoms improve. DONEPEZIL (ARICEPT) 5 MG TABLET    Take 5 mg by mouth nightly    ESOMEPRAZOLE MAGNESIUM (NEXIUM) 40 MG PACK    Take 1 packet by mouth 2 times daily. FOLIC ACID (FOLVITE) 1 MG TABLET    Take 1 tablet by mouth daily    LEVOTHYROXINE (SYNTHROID) 25 MCG TABLET    Take 1 tablet by mouth Daily    LISINOPRIL (PRINIVIL;ZESTRIL) 20 MG TABLET    Take 20 mg by mouth daily. ONDANSETRON (ZOFRAN) 4 MG TABLET    Take 1 tablet by mouth every 8 hours as needed for Nausea       Allergies     She is allergic to cephalosporins. ED Course     Nursing Notes, Past Medical Hx, Past Surgical Hx, Social Hx,Allergies, and Family Hx were reviewed.     Patient was given the following medications:  Orders Placed This Encounter   Medications    calcium gluconate 1,000 mg in dextrose 5 % 100 mL IVPB    magnesium oxide (MAG-OX) tablet 400 mg       Diagnostic Results     EKG   EKG Interpretation    Interpreted by me    Rhythm: Sinus tachycardia  Rate: normal  Axis: normal  Ectopy: none  Conduction: normal  ST Segments: no acute change  T Waves: no acute change  Q Waves: none    Clinical Impression: Sinus tachycardia, no ischemic changes      RECENT VITALS:  BP: (!) 164/79,Temp: 98.5 °F (36.9 °C), Pulse: 75, Resp: 16, SpO2: 93 %     RADIOLOGY:  No orders to display       LABS:   Results for orders placed or performed during the hospital encounter of 07/20/21   CBC Auto Differential   Result Value Ref Range    WBC 9.0 4.0 - 11.0 Diogenes Rosado MD  07/20/21 8541

## 2021-07-20 NOTE — ED NOTES
Bed: B18-18  Expected date:   Expected time:   Means of arrival:   Comments:  Naye Alva RN  07/20/21 3321

## 2021-07-21 VITALS
OXYGEN SATURATION: 100 % | SYSTOLIC BLOOD PRESSURE: 164 MMHG | RESPIRATION RATE: 16 BRPM | TEMPERATURE: 98.5 F | HEART RATE: 82 BPM | DIASTOLIC BLOOD PRESSURE: 74 MMHG

## 2021-07-21 LAB
BILIRUBIN URINE: ABNORMAL
BLOOD, URINE: ABNORMAL
CLARITY: CLEAR
COLOR: YELLOW
EPITHELIAL CELLS, UA: ABNORMAL /HPF (ref 0–5)
GLUCOSE URINE: NEGATIVE MG/DL
KETONES, URINE: 15 MG/DL
LEUKOCYTE ESTERASE, URINE: ABNORMAL
MICROSCOPIC EXAMINATION: YES
NITRITE, URINE: NEGATIVE
PH UA: 6 (ref 5–8)
PROTEIN UA: NEGATIVE MG/DL
RBC UA: ABNORMAL /HPF (ref 0–4)
SPECIFIC GRAVITY UA: 1.02 (ref 1–1.03)
URINE REFLEX TO CULTURE: ABNORMAL
URINE TYPE: ABNORMAL
UROBILINOGEN, URINE: 0.2 E.U./DL
WBC UA: ABNORMAL /HPF (ref 0–5)

## 2021-07-21 PROCEDURE — 81001 URINALYSIS AUTO W/SCOPE: CPT

## 2021-07-21 NOTE — CARE COORDINATION
CM notified of consult for placement. Met with pt and her daughter at the bedside. Pt is alert and oriented x 2-3. She has a history of dementia and has been declining cognitively and functionally. She has been getting increasingly confused, wandering, and had multiple falls (more than 3 in the last month). She is ambulatory and able to transfer herself but is weak. Gait is unsteady. Incontinent of bowel and bladder most of the time. Family prepares food which she can feed herself. She is able to dress herself with cues. She is able to perform hygiene with prompts and cues. Pt's daughter, Marbella Boyd, stated she is worried about Sunol Fent returning home. Pt's granddaughter has been staying with her but she works and has 2 children. Ideally, pt will go to SNF for some rehabilitation and likely transition into a memory care facility from there. Marbella Boyd gave 2 SNF's to send referral; [facility] and [facility]. Referral message left at [facility], Referral called to Deidra Todd at [place]. They are able to direct admit from the ED without PT/OT evals. Orders were placed, however, for PT/OT to see pt. MD updated. Addendum 1140  Pt has been accepted at [facility]. The [facility] does not have any beds available. Updated pt's daughter, Marbella Boyd. She is agreeable with a transfer to [place] from the ED this afternoon. [facility] will provide stretcher transport @230-300p. MD aware. DARI completed. RN Call report to [phone]  AVS fax [phone] Vaccinations: Pt has had both vaccinations.  The last was on 4/20/2021    Franklyn Mcdowell RN  Case Management  [phone]

## 2021-07-21 NOTE — ED NOTES
Patient given menu to order breakfast, VSS, no other needs at this time.       Yulissa Sibley, RN  07/21/21 4623

## 2021-07-21 NOTE — ED NOTES
Bed: A11-11  Expected date:   Expected time:   Means of arrival:   Comments:  Johan Harris 1233, RN  07/21/21 9473

## 2021-07-21 NOTE — ED PROVIDER NOTES
[address] [facility]          ATTENDING PHYSICIAN NOTE       Date of evaluation: 7/20/2021    ADDENDUM:      Care of this patient was assumed from Dr. Tressa Ford. The patient was seen for Fall (Slip and fall out of chair while sitting outside. Pt states she was unable to get up, denies LOC. Sitting outside for about an hour)  . The patient's initial evaluation and plan have been discussed with the prior provider who initially evaluated the patient. Nursing Notes, Past Medical Hx, Past Surgical Hx, Social Hx, Allergies, and Family Hx were all reviewed. Patient is a 60-year-old female with history of dementia who presents to the emergency department after being found on her porch by family. Patient believes she was on the ground for approximately 1 hour. Family states patient does live at home alone and is having worsening dementia so they do not believe that she is safe to be by herself and have been investigating placement into a facility. On arrival, patient was found to be hemodynamically stable. She was mildly tachycardic but this resolved with IV fluids. She has no evidence of trauma on exam.  EKG and laboratory studies were unremarkable. At time of turnover, urinalysis was pending. Diagnostic Results     EKG   EKG shows sinus tachycardia with no acute ischemic abnormalities.     LABS:   Results for orders placed or performed during the hospital encounter of 07/20/21   CBC Auto Differential   Result Value Ref Range    WBC 9.0 4.0 - 11.0 K/uL    RBC 3.79 (L) 4.00 - 5.20 M/uL    Hemoglobin 11.9 (L) 12.0 - 16.0 g/dL    Hematocrit 35.2 (L) 36.0 - 48.0 %    MCV 92.9 80.0 - 100.0 fL    MCH 31.5 26.0 - 34.0 pg    MCHC 34.0 31.0 - 36.0 g/dL    RDW 15.1 12.4 - 15.4 %    Platelets 641 482 - 403 K/uL    MPV 8.2 5.0 - 10.5 fL    Neutrophils % 62.4 %    Lymphocytes % 24.9 %    Monocytes % 10.7 %    Eosinophils % 1.4 %    Basophils % 0.6 %    Neutrophils Absolute 5.6 1.7 - 7.7 K/uL Lymphocytes Absolute 2.2 1.0 - 5.1 K/uL    Monocytes Absolute 1.0 0.0 - 1.3 K/uL    Eosinophils Absolute 0.1 0.0 - 0.6 K/uL    Basophils Absolute 0.1 0.0 - 0.2 K/uL   Basic Metabolic Panel w/ Reflex to MG   Result Value Ref Range    Sodium 139 136 - 145 mmol/L    Potassium reflex Magnesium 3.5 3.5 - 5.1 mmol/L    Chloride 105 99 - 110 mmol/L    CO2 20 (L) 21 - 32 mmol/L    Anion Gap 14 3 - 16    Glucose 107 (H) 70 - 99 mg/dL    BUN 25 (H) 7 - 20 mg/dL    CREATININE 1.3 (H) 0.6 - 1.2 mg/dL    GFR Non- 38 (A) >60    GFR  46 (A) >60    Calcium 7.5 (L) 8.3 - 10.6 mg/dL   CK   Result Value Ref Range    Total CK 24 (L) 26 - 192 U/L   Lactic Acid, Plasma   Result Value Ref Range    Lactic Acid 1.4 0.4 - 2.0 mmol/L   Magnesium   Result Value Ref Range    Magnesium 1.40 (L) 1.80 - 2.40 mg/dL   Urinalysis Reflex to Culture    Specimen: Urine, clean catch   Result Value Ref Range    Color, UA Yellow Straw/Yellow    Clarity, UA Clear Clear    Glucose, Ur Negative Negative mg/dL    Bilirubin Urine SMALL (A) Negative    Ketones, Urine 15 (A) Negative mg/dL    Specific Gravity, UA 1.020 1.005 - 1.030    Blood, Urine SMALL (A) Negative    pH, UA 6.0 5.0 - 8.0    Protein, UA Negative Negative mg/dL    Urobilinogen, Urine 0.2 <2.0 E.U./dL    Nitrite, Urine Negative Negative    Leukocyte Esterase, Urine SMALL (A) Negative    Microscopic Examination YES     Urine Type Voided     Urine Reflex to Culture Not Indicated    Microscopic Urinalysis   Result Value Ref Range    WBC, UA 0-2 0 - 5 /HPF    RBC, UA 3-4 0 - 4 /HPF    Epithelial Cells, UA 6-10 (A) 0 - 5 /HPF   POCT Glucose   Result Value Ref Range    POC Glucose 87 70 - 99 mg/dl    Performed on [employer]    EKG 12 Lead   Result Value Ref Range    Ventricular Rate 106 BPM    Atrial Rate 106 BPM    P-R Interval 158 ms    QRS Duration 66 ms    Q-T Interval 354 ms    QTc Calculation (Bazett) 470 ms    P Axis 49 degrees    R Axis 15 degrees    T Axis 54 degrees    Diagnosis       EKG performed in ER and to be interpreted by ER physician. Confirmed by MD, ER ([id number]),  Barry Whyte ([phone]) on 7/20/2021 7:09:49 PM       RECENT VITALS:  BP: (!) 128/54, Temp: 98.5 °F (36.9 °C), Pulse: 75, Resp: 16, SpO2: 93 %     Procedures     N/A    ED Course     The patient was given the following medications:  Orders Placed This Encounter   Medications    calcium gluconate 1,000 mg in dextrose 5 % 100 mL IVPB    magnesium oxide (MAG-OX) tablet 400 mg       CONSULTS:  None    MEDICAL DECISION MAKING / ASSESSMENT / Abhishekjenn Vazquez is a 80 y.o. female with history of dementia presents to the emergency department after being found down at home. Family states that she has had worsening of her dementia and did not believe it safe for her to be at home. On arrival, the patient is awake and alert with no focal deficits. She has no evidence of trauma on exam.  Laboratory studies are unremarkable. Urinalysis shows no evidence of infection. Patient will be evaluated in the morning by social work to facilitate possible placement. Clinical Impression     1. Dementia with behavioral disturbance, unspecified dementia type ([address]    2. Fall from chair, initial encounter    3. Heat exposure, initial encounter        Disposition     PATIENT REFERRED TO:  No follow-up provider specified.     DISCHARGE MEDICATIONS:  New Prescriptions    No medications on file       DISPOSITION          Ladarius Maynard MD  07/21/21 4105

## 2021-07-21 NOTE — ED NOTES
PT'S GRANDDAUGHTER WOULD LIKE TO BE CALLED WITH UPDATES OR CHANGES TO PLAN.      [address] [facility],[address], RN  07/20/21 7111

## 2021-07-21 NOTE — DISCHARGE INSTR - COC
Continuity of Care Form    Patient Name: Berlin Garcia   :  3/26/1927  MRN:  [id number]    Admit date:  2021  Discharge date:  21    Code Status Order: Prior   Advance Directives:     Admitting Physician:  No admitting provider for patient encounter.   PCP: Bo Wharton    Discharging Nurse:   [address] Unit/Room#: V62/G56-11  Discharging Unit Phone Number:     Emergency Contact:   Extended Emergency Contact Information  Primary Emergency Contact: Luiz VEE  Home Phone: [phone]  Relation: Child  Secondary Emergency Contact: Amparo Ballard  Home Phone: [phone]  Relation: Grandchild    Past Surgical History:  Past Surgical History:   Procedure Laterality Date    OTHER SURGICAL HISTORY  14    ERCP foreigh body removal       Immunization History:   Immunization History   Administered Date(s) Administered    Influenza Vaccine, unspecified formulation 10/23/2007, 2008, 10/06/2009, 2012, 10/15/2013, 2014, 10/26/2015, 2016, 2017    Influenza Virus Vaccine 10/15/2019    Pneumococcal Conjugate 13-valent (Acevedo Bill) 10/26/2015    Pneumococcal Polysaccharide ([id number]) 1996, 2005       Active Problems:  Patient Active Problem List   Diagnosis Code    Esophageal stricture K22.2    Acute metabolic encephalopathy E30.58    HTN (hypertension) I10    Acute bronchitis J20.9    Sepsis vs SIRS  (WBC 15.7K, HR, Lactate>2) A41.9    Dementia ([address] F03.90    Acute metabolic encephalopathy H36.88       Isolation/Infection:   Isolation          No Isolation        Patient Infection Status     Infection Onset Added Last Indicated Last Indicated By Review Planned Expiration Resolved Resolved By    None active    Resolved    COVID-19 Rule Out 21 COVID-19 (Ordered)   21 Rule-Out Test Resulted          Nurse Assessment:  Last Vital Signs: BP (!) 178/76   Pulse 75   Temp 98.5 °F (36.9 °C) (Oral)   Resp 16 Manager/ signature: Electronically signed by Nilam Sehr RN on 7/21/21 at 11:43 AM EDT    PHYSICIAN SECTION    Prognosis: Fair    Condition at Discharge: Stable    Rehab Potential (if transferring to Rehab): Fair    Recommended Labs or Other Treatments After Discharge: PT/OT    Physician Certification: I certify the above information and transfer of Luc Polo  is necessary for the continuing treatment of the diagnosis listed and that she requires [facility] for short term rehabilitation to be determined by progress with PT/OT.      Update Admission H&P: No change in H&P    PHYSICIAN SIGNATURE:  Electronically signed by Mateo Beard MD on 7/21/21 at 11:42 AM EDT

## 2021-07-21 NOTE — ED NOTES
Report called to Irais Mata RN, [facility].       Ela Burnham RN  07/21/21 8776

## 2021-07-21 NOTE — ED PROVIDER NOTES
The patient was turned over pending case management/social work evaluation for possible nursing home placement. Social work was able to place the patient at a skilled nursing facility. She will be discharged with transportation pending.      Nato Tam MD  07/21/21 1147

## 2021-08-14 ENCOUNTER — APPOINTMENT (OUTPATIENT)
Dept: CT IMAGING | Age: 86
End: 2021-08-14
Payer: MEDICARE

## 2021-08-14 ENCOUNTER — APPOINTMENT (OUTPATIENT)
Dept: GENERAL RADIOLOGY | Age: 86
End: 2021-08-14
Payer: MEDICARE

## 2021-08-14 ENCOUNTER — HOSPITAL ENCOUNTER (EMERGENCY)
Age: 86
Discharge: NURSING, REHAB OR LONG-TERM CARE | End: 2021-08-15
Attending: EMERGENCY MEDICINE
Payer: MEDICARE
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
## 2021-08-14 DIAGNOSIS — N30.00 ACUTE CYSTITIS WITHOUT HEMATURIA: ICD-10-CM

## 2021-08-14 LAB
ANION GAP SERPL CALCULATED.3IONS-SCNC: 8 MMOL/L (ref 3–16)
BACTERIA: ABNORMAL /HPF
BASOPHILS ABSOLUTE: 0.1 K/UL (ref 0–0.2)
BASOPHILS RELATIVE PERCENT: 0.5 %
BILIRUBIN URINE: NEGATIVE
BLOOD, URINE: ABNORMAL
BUN BLDV-MCNC: 26 MG/DL (ref 7–20)
CALCIUM SERPL-MCNC: 9.3 MG/DL (ref 8.3–10.6)
CHLORIDE BLD-SCNC: 103 MMOL/L (ref 99–110)
CLARITY: CLEAR
CO2: 27 MMOL/L (ref 21–32)
COLOR: YELLOW
CREAT SERPL-MCNC: 1.3 MG/DL (ref 0.6–1.2)
EOSINOPHILS ABSOLUTE: 0.1 K/UL (ref 0–0.6)
EOSINOPHILS RELATIVE PERCENT: 0.6 %
EPITHELIAL CELLS, UA: ABNORMAL /HPF (ref 0–5)
GFR AFRICAN AMERICAN: 46
GFR NON-AFRICAN AMERICAN: 38
GLUCOSE BLD-MCNC: 125 MG/DL (ref 70–99)
GLUCOSE URINE: NEGATIVE MG/DL
HCT VFR BLD CALC: 40.9 % (ref 36–48)
HEMOGLOBIN: 13.8 G/DL (ref 12–16)
KETONES, URINE: NEGATIVE MG/DL
LEUKOCYTE ESTERASE, URINE: ABNORMAL
LYMPHOCYTES ABSOLUTE: 1.8 K/UL (ref 1–5.1)
LYMPHOCYTES RELATIVE PERCENT: 15.2 %
MCH RBC QN AUTO: 31.8 PG (ref 26–34)
MCHC RBC AUTO-ENTMCNC: 33.8 G/DL (ref 31–36)
MCV RBC AUTO: 94.2 FL (ref 80–100)
MICROSCOPIC EXAMINATION: YES
MONOCYTES ABSOLUTE: 0.9 K/UL (ref 0–1.3)
MONOCYTES RELATIVE PERCENT: 7.7 %
NEUTROPHILS ABSOLUTE: 8.8 K/UL (ref 1.7–7.7)
NEUTROPHILS RELATIVE PERCENT: 76 %
NITRITE, URINE: NEGATIVE
PDW BLD-RTO: 15.7 % (ref 12.4–15.4)
PH UA: 5.5 (ref 5–8)
PLATELET # BLD: 300 K/UL (ref 135–450)
PMV BLD AUTO: 8.3 FL (ref 5–10.5)
POTASSIUM REFLEX MAGNESIUM: 5.6 MMOL/L (ref 3.5–5.1)
PRO-BNP: 689 PG/ML (ref 0–449)
PROTEIN UA: ABNORMAL MG/DL
RBC # BLD: 4.34 M/UL (ref 4–5.2)
RBC UA: ABNORMAL /HPF (ref 0–4)
SODIUM BLD-SCNC: 138 MMOL/L (ref 136–145)
SPECIFIC GRAVITY UA: >=1.03 (ref 1–1.03)
TOTAL CK: 37 U/L (ref 26–192)
TROPONIN: <0.01 NG/ML
URINE TYPE: ABNORMAL
UROBILINOGEN, URINE: 0.2 E.U./DL
WBC # BLD: 11.6 K/UL (ref 4–11)
WBC UA: ABNORMAL /HPF (ref 0–5)

## 2021-08-14 PROCEDURE — 93005 ELECTROCARDIOGRAM TRACING: CPT | Performed by: STUDENT IN AN ORGANIZED HEALTH CARE EDUCATION/TRAINING PROGRAM

## 2021-08-14 PROCEDURE — 70450 CT HEAD/BRAIN W/O DYE: CPT

## 2021-08-14 PROCEDURE — 6360000002 HC RX W HCPCS: Performed by: STUDENT IN AN ORGANIZED HEALTH CARE EDUCATION/TRAINING PROGRAM

## 2021-08-14 PROCEDURE — 73110 X-RAY EXAM OF WRIST: CPT

## 2021-08-14 PROCEDURE — 90715 TDAP VACCINE 7 YRS/> IM: CPT | Performed by: STUDENT IN AN ORGANIZED HEALTH CARE EDUCATION/TRAINING PROGRAM

## 2021-08-14 PROCEDURE — 73060 X-RAY EXAM OF HUMERUS: CPT

## 2021-08-14 PROCEDURE — 82550 ASSAY OF CK (CPK): CPT

## 2021-08-14 PROCEDURE — 84484 ASSAY OF TROPONIN QUANT: CPT

## 2021-08-14 PROCEDURE — 85025 COMPLETE CBC W/AUTO DIFF WBC: CPT

## 2021-08-14 PROCEDURE — 81003 URINALYSIS AUTO W/O SCOPE: CPT

## 2021-08-14 PROCEDURE — 83880 ASSAY OF NATRIURETIC PEPTIDE: CPT

## 2021-08-14 PROCEDURE — 36415 COLL VENOUS BLD VENIPUNCTURE: CPT

## 2021-08-14 PROCEDURE — 72125 CT NECK SPINE W/O DYE: CPT

## 2021-08-14 PROCEDURE — 90471 IMMUNIZATION ADMIN: CPT | Performed by: STUDENT IN AN ORGANIZED HEALTH CARE EDUCATION/TRAINING PROGRAM

## 2021-08-14 PROCEDURE — 71045 X-RAY EXAM CHEST 1 VIEW: CPT

## 2021-08-14 PROCEDURE — 80048 BASIC METABOLIC PNL TOTAL CA: CPT

## 2021-08-14 PROCEDURE — 73590 X-RAY EXAM OF LOWER LEG: CPT

## 2021-08-14 PROCEDURE — 99285 EMERGENCY DEPT VISIT HI MDM: CPT

## 2021-08-14 RX ADMIN — TETANUS TOXOID, REDUCED DIPHTHERIA TOXOID AND ACELLULAR PERTUSSIS VACCINE, ADSORBED 0.5 ML: 5; 2.5; 8; 8; 2.5 SUSPENSION INTRAMUSCULAR at 20:24

## 2021-08-14 NOTE — ED NOTES
Pt states she fell but unsure why. C/o pain in right forearm but has full ROM and able to use arm to pull herself up in bed. . No swelling, no deformity. 2 skin tears noted to area that had some steri strips in place with controlled bleeding. Steri strips were somewhat loose so I removed them, cleansed area with saline and placed new steri strips after edged of skin were approximated. Scant amt of bleeding noted. Pt tolerated well.   Denies pain anywhere else     Ailin Dickinson RN  08/14/21 0348

## 2021-08-15 VITALS
DIASTOLIC BLOOD PRESSURE: 73 MMHG | SYSTOLIC BLOOD PRESSURE: 164 MMHG | HEART RATE: 66 BPM | RESPIRATION RATE: 32 BRPM | OXYGEN SATURATION: 98 % | TEMPERATURE: 97.5 F

## 2021-08-15 LAB
ANION GAP SERPL CALCULATED.3IONS-SCNC: 7 MMOL/L (ref 3–16)
BUN BLDV-MCNC: 24 MG/DL (ref 7–20)
CALCIUM SERPL-MCNC: 8.5 MG/DL (ref 8.3–10.6)
CHLORIDE BLD-SCNC: 105 MMOL/L (ref 99–110)
CO2: 26 MMOL/L (ref 21–32)
CREAT SERPL-MCNC: 1 MG/DL (ref 0.6–1.2)
EKG ATRIAL RATE: 62 BPM
EKG DIAGNOSIS: NORMAL
EKG P AXIS: 45 DEGREES
EKG P-R INTERVAL: 164 MS
EKG Q-T INTERVAL: 404 MS
EKG QRS DURATION: 64 MS
EKG QTC CALCULATION (BAZETT): 410 MS
EKG R AXIS: 17 DEGREES
EKG T AXIS: 65 DEGREES
EKG VENTRICULAR RATE: 62 BPM
GFR AFRICAN AMERICAN: >60
GFR NON-AFRICAN AMERICAN: 52
GLUCOSE BLD-MCNC: 121 MG/DL (ref 70–99)
POTASSIUM REFLEX MAGNESIUM: 4.8 MMOL/L (ref 3.5–5.1)
SODIUM BLD-SCNC: 138 MMOL/L (ref 136–145)

## 2021-08-15 PROCEDURE — 6370000000 HC RX 637 (ALT 250 FOR IP): Performed by: STUDENT IN AN ORGANIZED HEALTH CARE EDUCATION/TRAINING PROGRAM

## 2021-08-15 RX ORDER — CIPROFLOXACIN 500 MG/1
250 TABLET, FILM COATED ORAL ONCE
Status: COMPLETED | OUTPATIENT
Start: 2021-08-15 | End: 2021-08-15

## 2021-08-15 RX ORDER — CIPROFLOXACIN 250 MG/1
250 TABLET, FILM COATED ORAL 2 TIMES DAILY
Qty: 14 TABLET | Refills: 0 | Status: SHIPPED | OUTPATIENT
Start: 2021-08-15 | End: 2021-08-22

## 2021-08-15 RX ADMIN — CIPROFLOXACIN 250 MG: 500 TABLET, FILM COATED ORAL at 00:51

## 2021-08-15 NOTE — ED PROVIDER NOTES
mouth daily. ONDANSETRON (ZOFRAN) 4 MG TABLET    Take 1 tablet by mouth every 8 hours as needed for Nausea       Allergies     She is allergic to cephalosporins. Physical Exam     INITIAL VITALS: BP: (!) 152/76, Temp: 97.5 °F (36.4 °C), Pulse: 70, Resp: 24, SpO2: 95 %   Physical Exam  Vitals and nursing note reviewed. Constitutional:       Appearance: She is normal weight. HENT:      Head: Normocephalic and atraumatic. Right Ear: Tympanic membrane normal.      Left Ear: Tympanic membrane normal.      Nose: Nose normal.      Mouth/Throat:      Mouth: Mucous membranes are moist.      Pharynx: Oropharynx is clear. No oropharyngeal exudate. Eyes:      Extraocular Movements: Extraocular movements intact. Conjunctiva/sclera: Conjunctivae normal.      Pupils: Pupils are equal, round, and reactive to light. Cardiovascular:      Rate and Rhythm: Normal rate and regular rhythm. Pulses: Normal pulses. Pulmonary:      Effort: Pulmonary effort is normal. No respiratory distress. Breath sounds: Normal breath sounds. Abdominal:      Palpations: Abdomen is soft. Tenderness: There is no abdominal tenderness. Musculoskeletal:         General: Normal range of motion. Cervical back: Normal range of motion. No rigidity or tenderness. Right lower leg: No edema. Left lower leg: No edema. Skin:     General: Skin is warm and dry. Comments: 2 lacerations over left forarms  No bruising noted   Neurological:      General: No focal deficit present. Mental Status: She is alert. Mental status is at baseline. Cranial Nerves: No cranial nerve deficit. DiagnosticResults     EKG   Interpreted in conjunction with emergencydepartment physician Peter Cuenca MD  Rhythm: normal sinus   Rate: normal  Axis: normal  Ectopy: none  Conduction: normal  ST Segments: normal  T Waves:normal  Q Waves: none  NSR    RADIOLOGY:  CT CERVICAL SPINE WO CONTRAST   Final Result   1.  No identified in both lung apices. IMPRESSION:   1. No evidence for acute osseous process cervical spine   2. 2.1 mm of anterolisthesis of C7 on T1 related to spondylosis, in the form of mild degenerative disc disease and marked facet arthrosis   3. Diffuse at least moderate facet arthrosis all other levels      XR HUMERUS RIGHT (MIN 2 VIEWS)   Final Result      Diffuse interstitial process which could related to interstitial fibrosis versus an atypical interstitial viral etiology, COVID.            2 views of the left anatomic leg      FINDINGS:      No cortical disruption or periosteal elevation. There is narrowing of the medial lateral compartments of the knee with osteophytes. IMPRESSION:   1. No evidence for acute osseous process anatomic leg   2. Osteophyte is medial lateral compartments of the knee            2 views right humerus      FINDINGS:      Joint space narrowing, ossify formation identified of the acromial clavicular joint with pannus formation. The humerus is intact. Elbow appears to be located. IMPRESSION:   1. No evidence for acute osseous process right humerus   2. 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 Patient's bones are osteopenic. Marked joint space narrowing ossify formation of the second and third metacarpal phalangeal joints identified. The proximal distal carpal rows are intact. Joint space    during ossify formation of the first metacarpal phalangeal joint present. IMPRESSION:   1. Equivocal fracture through the radial styloid   2. Osteoarthritis involving the second and third metacarpal phalangeal joints for can be seen with CPPD   3. Marked osteophytosis first metacarpal phalangeal joint   4.  Osteopenia      XR WRIST RIGHT (MIN 3 VIEWS)   Final Result      Diffuse interstitial process which could related to interstitial fibrosis versus an atypical interstitial viral etiology, COVID.            2 views of the left anatomic leg      FINDINGS:      No cortical disruption or periosteal elevation. There is narrowing of the medial lateral compartments of the knee with osteophytes. IMPRESSION:   1. No evidence for acute osseous process anatomic leg   2. Osteophyte is medial lateral compartments of the knee            2 views right humerus      FINDINGS:      Joint space narrowing, ossify formation identified of the acromial clavicular joint with pannus formation. The humerus is intact. Elbow appears to be located. IMPRESSION:   1. No evidence for acute osseous process right humerus   2. 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 Patient's bones are osteopenic. Marked joint space narrowing ossify formation of the second and third metacarpal phalangeal joints identified. The proximal distal carpal rows are intact. Joint space    during ossify formation of the first metacarpal phalangeal joint present. IMPRESSION:   1. Equivocal fracture through the radial styloid   2. Osteoarthritis involving the second and third metacarpal phalangeal joints for can be seen with CPPD   3. Marked osteophytosis first metacarpal phalangeal joint   4. Osteopenia      XR CHEST PORTABLE   Final Result      Diffuse interstitial process which could related to interstitial fibrosis versus an atypical interstitial viral etiology, COVID.            2 views of the left anatomic leg      FINDINGS:      No cortical disruption or periosteal elevation. There is narrowing of the medial lateral compartments of the knee with osteophytes. IMPRESSION:   1. No evidence for acute osseous process anatomic leg   2.  Osteophyte is medial lateral compartments of the knee            2 views right humerus      FINDINGS: Joint space narrowing, ossify formation identified of the acromial clavicular joint with pannus formation. The humerus is intact. Elbow appears to be located. IMPRESSION:   1. No evidence for acute osseous process right humerus   2. 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 Patient's bones are osteopenic. Marked joint space narrowing ossify formation of the second and third metacarpal phalangeal joints identified. The proximal distal carpal rows are intact. Joint space    during ossify formation of the first metacarpal phalangeal joint present. IMPRESSION:   1. Equivocal fracture through the radial styloid   2. Osteoarthritis involving the second and third metacarpal phalangeal joints for can be seen with CPPD   3. Marked osteophytosis first metacarpal phalangeal joint   4. Osteopenia      XR TIBIA FIBULA LEFT (2 VIEWS)   Final Result      Diffuse interstitial process which could related to interstitial fibrosis versus an atypical interstitial viral etiology, COVID.            2 views of the left anatomic leg      FINDINGS:      No cortical disruption or periosteal elevation. There is narrowing of the medial lateral compartments of the knee with osteophytes. IMPRESSION:   1. No evidence for acute osseous process anatomic leg   2. Osteophyte is medial lateral compartments of the knee            2 views right humerus      FINDINGS:      Joint space narrowing, ossify formation identified of the acromial clavicular joint with pannus formation. The humerus is intact. Elbow appears to be located. IMPRESSION:   1. No evidence for acute osseous process right humerus   2. 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  Patient's bones are osteopenic. Marked joint space narrowing ossify formation of the second and third metacarpal phalangeal joints identified. The proximal distal carpal rows are intact. Joint space    during ossify formation of the first metacarpal phalangeal joint present. IMPRESSION:   1. Equivocal fracture through the radial styloid   2. Osteoarthritis involving the second and third metacarpal phalangeal joints for can be seen with CPPD   3. Marked osteophytosis first metacarpal phalangeal joint   4.  Osteopenia          LABS:   Results for orders placed or performed during the hospital encounter of 08/14/21   CBC Auto Differential   Result Value Ref Range    WBC 11.6 (H) 4.0 - 11.0 K/uL    RBC 4.34 4.00 - 5.20 M/uL    Hemoglobin 13.8 12.0 - 16.0 g/dL    Hematocrit 40.9 36.0 - 48.0 %    MCV 94.2 80.0 - 100.0 fL    MCH 31.8 26.0 - 34.0 pg    MCHC 33.8 31.0 - 36.0 g/dL    RDW 15.7 (H) 12.4 - 15.4 %    Platelets 498 540 - 058 K/uL    MPV 8.3 5.0 - 10.5 fL    Neutrophils % 76.0 %    Lymphocytes % 15.2 %    Monocytes % 7.7 %    Eosinophils % 0.6 %    Basophils % 0.5 %    Neutrophils Absolute 8.8 (H) 1.7 - 7.7 K/uL    Lymphocytes Absolute 1.8 1.0 - 5.1 K/uL    Monocytes Absolute 0.9 0.0 - 1.3 K/uL    Eosinophils Absolute 0.1 0.0 - 0.6 K/uL    Basophils Absolute 0.1 0.0 - 0.2 K/uL   Basic Metabolic Panel w/ Reflex to MG   Result Value Ref Range    Sodium 138 136 - 145 mmol/L    Potassium reflex Magnesium 5.6 (H) 3.5 - 5.1 mmol/L    Chloride 103 99 - 110 mmol/L    CO2 27 21 - 32 mmol/L    Anion Gap 8 3 - 16    Glucose 125 (H) 70 - 99 mg/dL    BUN 26 (H) 7 - 20 mg/dL    CREATININE 1.3 (H) 0.6 - 1.2 mg/dL    GFR Non- 38 (A) >60    GFR  46 (A) >60    Calcium 9.3 8.3 - 10.6 mg/dL   Urinalysis, reflex to microscopic   Result Value Ref Range    Color, UA Yellow Straw/Yellow    Clarity, UA Clear Clear    Glucose, Ur Negative Negative mg/dL    Bilirubin Urine Negative Negative    Ketones, Urine Negative Negative mg/dL    Specific Gravity, UA >=1.030 1.005 - 1.030    Blood, Urine TRACE-INTACT (A) Negative    pH, UA 5.5 5.0 - 8.0    Protein, UA TRACE (A) Negative mg/dL    Urobilinogen, Urine 0.2 <2.0 E.U./dL    Nitrite, Urine Negative Negative    Leukocyte Esterase, Urine MODERATE (A) Negative    Microscopic Examination YES     Urine Type NotGiven    Troponin   Result Value Ref Range    Troponin <0.01 <0.01 ng/mL   Brain Natriuretic Peptide   Result Value Ref Range    Pro- (H) 0 - 449 pg/mL   CK (Lab)   Result Value Ref Range    Total CK 37 26 - 192 U/L   Microscopic Urinalysis   Result Value Ref Range    WBC, UA 21-50 (A) 0 - 5 /HPF    RBC, UA 0-2 0 - 4 /HPF    Epithelial Cells, UA 6-10 (A) 0 - 5 /HPF    Bacteria, UA 4+ (A) None Seen /HPF   Basic Metabolic Panel w/ Reflex to MG   Result Value Ref Range    Sodium 138 136 - 145 mmol/L    Potassium reflex Magnesium 4.8 3.5 - 5.1 mmol/L    Chloride 105 99 - 110 mmol/L    CO2 26 21 - 32 mmol/L    Anion Gap 7 3 - 16    Glucose 121 (H) 70 - 99 mg/dL    BUN 24 (H) 7 - 20 mg/dL    CREATININE 1.0 0.6 - 1.2 mg/dL    GFR Non-African American 52 (A) >60    GFR African American >60 >60    Calcium 8.5 8.3 - 10.6 mg/dL       ED BEDSIDE ULTRASOUND:  None    RECENT VITALS:  BP: (!) 145/73, Temp: 97.5 °F (36.4 °C), Pulse: 62,Resp: 22, SpO2: 98 %     Procedures     None     ED Course     Nursing Notes, Past Medical Hx, Past Surgical Hx, Social Hx, Allergies, and Family Hx were reviewed.     The patient was given the followingmedications:  Orders Placed This Encounter   Medications    Tetanus-Diphth-Acell Pertussis (BOOSTRIX) injection 0.5 mL    ciprofloxacin (CIPRO) tablet 250 mg     Order Specific Question:   Antimicrobial Indications     Answer:   Urinary Tract Infection    ciprofloxacin (CIPRO) 250 MG tablet     Sig: Take 1 tablet by mouth 2 times daily for 7 days     Dispense:  14 tablet     Refill:  0       CONSULTS:  None    MEDICAL DECISION MAKING / ASSESSMENT / PLAN     Berlin Garcia is a 80 y.o. female with a history dementia presenting after an unwitnessed fall. Patient resides at nursing facility and was brought in by EMS after having unwitnessed fall at the nursing facility. Upon presentation the patient is afebrile and hemodynamically stable. She is overall well well-appearing. She is alert to self and place only. She does not remember the incident. She is unsure if she hit her head. She denies any current chest pain or shortness of breath. She has a nonfocal neurologic exam.  She does have some lacerations over her right forearm which were cleaned and Steri-Strips were placed. CT scan of the head did not show any hemorrhage, her CT cervical spine did not show any evidence of fracture. X-rays were obtained of her extremities where she had pain which did not show any bony fracture. Laboratory evaluation had a negative cardiac work-up with a normal sinus rhythm EKG without any ischemic changes and normal intervals. Patient was noted to have a urinary tract infection, her urine was sent for culture. Patient has an allergy to cephalosporins and will be treated with ciprofloxacin. She will get her first dose in the emergency department and will be discharged home with a prescription. Discharge instructions include following up with her primary care doctor in 2 to 3 days and strict ED return precautions. This patient was also evaluated by the attending physician. All care plans werediscussed and agreed upon. Clinical Impression     1. Fall from bed, initial encounter    2. Acute cystitis without hematuria        Disposition     PATIENT REFERRED TO:  [address]   [address] [place] [address]  [phone]            DISCHARGE MEDICATIONS:  New Prescriptions    CIPROFLOXACIN (CIPRO) 250 MG TABLET    Take 1 tablet by mouth 2 times daily for 7 days       DISPOSITION Decision To Discharge 08/15/2021 12:02:33 AM       Nemesio Otoniel Townsend MD  Resident  08/15/21 3023

## 2021-08-15 NOTE — ED PROVIDER NOTES
ED Attending Attestation Note     Date of evaluation: 8/14/2021    This patient was seen by the resident. I have seen and examined the patient, agree with the workup, evaluation, management and diagnosis. The care plan has been discussed. I have reviewed the ECG and concur with the resident's interpretation. My assessment reveals a 79-year-old female who presents with chief complaint of arm pain. Patient does not remember falling but states she fell in her right arm was hurting. Denies pain now at this time. Patient overall well-appearing, atraumatic, GCS 15, move arms and legs, no pain, benign abdomen. Eulalio Donaldson MD  08/14/21 1820

## 2023-12-06 ENCOUNTER — HOSPITAL ENCOUNTER (INPATIENT)
Age: 88
LOS: 1 days | Discharge: HOME | End: 2023-12-07
Attending: EMERGENCY MEDICINE | Admitting: STUDENT IN AN ORGANIZED HEALTH CARE EDUCATION/TRAINING PROGRAM
Payer: MEDICARE

## 2023-12-06 ENCOUNTER — APPOINTMENT (OUTPATIENT)
Dept: GENERAL RADIOLOGY | Age: 88
End: 2023-12-06
Payer: MEDICARE

## 2023-12-06 DIAGNOSIS — A41.9 SEVERE SEPSIS (HCC): ICD-10-CM

## 2023-12-06 DIAGNOSIS — J18.9 PNEUMONIA DUE TO INFECTIOUS ORGANISM, UNSPECIFIED LATERALITY, UNSPECIFIED PART OF LUNG: Primary | ICD-10-CM

## 2023-12-06 DIAGNOSIS — R65.20 SEVERE SEPSIS (HCC): ICD-10-CM

## 2023-12-06 LAB
ALBUMIN SERPL-MCNC: 2.1 G/DL (ref 3.4–5)
ALBUMIN/GLOB SERPL: 0.4 {RATIO} (ref 1.1–2.2)
ALP SERPL-CCNC: 121 U/L (ref 40–129)
ALT SERPL-CCNC: 21 U/L (ref 10–40)
ANION GAP SERPL CALCULATED.3IONS-SCNC: 13 MMOL/L (ref 3–16)
ANISOCYTOSIS BLD QL SMEAR: ABNORMAL
AST SERPL-CCNC: 39 U/L (ref 15–37)
BASE EXCESS BLDV CALC-SCNC: 1.8 MMOL/L (ref -3–3)
BASOPHILS # BLD: 0 K/UL (ref 0–0.2)
BASOPHILS NFR BLD: 0 %
BILIRUB SERPL-MCNC: 0.6 MG/DL (ref 0–1)
BUN SERPL-MCNC: 41 MG/DL (ref 7–20)
CALCIUM SERPL-MCNC: 9.2 MG/DL (ref 8.3–10.6)
CHLORIDE SERPL-SCNC: 102 MMOL/L (ref 99–110)
CO2 BLDV-SCNC: 28 MMOL/L
CO2 SERPL-SCNC: 24 MMOL/L (ref 21–32)
COHGB MFR BLDV: 2.6 % (ref 0–1.5)
CREAT SERPL-MCNC: 1.1 MG/DL (ref 0.6–1.2)
DEPRECATED RDW RBC AUTO: 15.3 % (ref 12.4–15.4)
EOSINOPHIL # BLD: 0 K/UL (ref 0–0.6)
EOSINOPHIL NFR BLD: 0 %
FLUAV RNA RESP QL NAA+PROBE: NOT DETECTED
FLUBV RNA RESP QL NAA+PROBE: NOT DETECTED
GFR SERPLBLD CREATININE-BSD FMLA CKD-EPI: 46 ML/MIN/{1.73_M2}
GLUCOSE SERPL-MCNC: 170 MG/DL (ref 70–99)
HCO3 BLDV-SCNC: 26.4 MMOL/L (ref 23–29)
HCT VFR BLD AUTO: 42.3 % (ref 36–48)
HGB BLD-MCNC: 13.8 G/DL (ref 12–16)
LACTATE BLDV-SCNC: 2 MMOL/L (ref 0.4–1.9)
LYMPHOCYTES # BLD: 3.9 K/UL (ref 1–5.1)
LYMPHOCYTES NFR BLD: 14 %
MACROCYTES BLD QL SMEAR: ABNORMAL
MCH RBC QN AUTO: 30.4 PG (ref 26–34)
MCHC RBC AUTO-ENTMCNC: 32.6 G/DL (ref 31–36)
MCV RBC AUTO: 93.4 FL (ref 80–100)
METHGB MFR BLDV: 0.3 %
MICROCYTES BLD QL SMEAR: ABNORMAL
MONOCYTES # BLD: 1.6 K/UL (ref 0–1.3)
MONOCYTES NFR BLD: 6 %
NEUTROPHILS # BLD: 20.6 K/UL (ref 1.7–7.7)
NEUTROPHILS NFR BLD: 59 %
NEUTS BAND NFR BLD MANUAL: 20 % (ref 0–7)
NEUTS VAC BLD QL SMEAR: PRESENT
O2 THERAPY: ABNORMAL
OVALOCYTES BLD QL SMEAR: ABNORMAL
PCO2 BLDV: 41.1 MMHG (ref 40–50)
PH BLDV: 7.42 [PH] (ref 7.35–7.45)
PLATELET # BLD AUTO: 467 K/UL (ref 135–450)
PLATELET BLD QL SMEAR: ABNORMAL
PMV BLD AUTO: 8.5 FL (ref 5–10.5)
PO2 BLDV: 57.3 MMHG (ref 25–40)
POIKILOCYTOSIS BLD QL SMEAR: ABNORMAL
POLYCHROMASIA BLD QL SMEAR: ABNORMAL
POTASSIUM SERPL-SCNC: 5 MMOL/L (ref 3.5–5.1)
PROCALCITONIN SERPL IA-MCNC: 0.26 NG/ML (ref 0–0.15)
PROT SERPL-MCNC: 7.5 G/DL (ref 6.4–8.2)
RBC # BLD AUTO: 4.53 M/UL (ref 4–5.2)
REASON FOR REJECTION: NORMAL
REJECTED TEST: NORMAL
SAO2 % BLDV: 91 %
SARS-COV-2 RNA RESP QL NAA+PROBE: NOT DETECTED
SLIDE REVIEW: ABNORMAL
SODIUM SERPL-SCNC: 139 MMOL/L (ref 136–145)
VARIANT LYMPHS NFR BLD MANUAL: 1 % (ref 0–6)
WBC # BLD AUTO: 26.1 K/UL (ref 4–11)

## 2023-12-06 PROCEDURE — 94761 N-INVAS EAR/PLS OXIMETRY MLT: CPT

## 2023-12-06 PROCEDURE — 96365 THER/PROPH/DIAG IV INF INIT: CPT

## 2023-12-06 PROCEDURE — 85025 COMPLETE CBC W/AUTO DIFF WBC: CPT

## 2023-12-06 PROCEDURE — 87636 SARSCOV2 & INF A&B AMP PRB: CPT

## 2023-12-06 PROCEDURE — 87040 BLOOD CULTURE FOR BACTERIA: CPT

## 2023-12-06 PROCEDURE — 84145 PROCALCITONIN (PCT): CPT

## 2023-12-06 PROCEDURE — 80053 COMPREHEN METABOLIC PANEL: CPT

## 2023-12-06 PROCEDURE — 36415 COLL VENOUS BLD VENIPUNCTURE: CPT

## 2023-12-06 PROCEDURE — 71045 X-RAY EXAM CHEST 1 VIEW: CPT

## 2023-12-06 PROCEDURE — 82803 BLOOD GASES ANY COMBINATION: CPT

## 2023-12-06 PROCEDURE — 2700000000 HC OXYGEN THERAPY PER DAY

## 2023-12-06 PROCEDURE — 1200000000 HC SEMI PRIVATE

## 2023-12-06 PROCEDURE — 93005 ELECTROCARDIOGRAM TRACING: CPT | Performed by: EMERGENCY MEDICINE

## 2023-12-06 PROCEDURE — 83605 ASSAY OF LACTIC ACID: CPT

## 2023-12-06 PROCEDURE — 2580000003 HC RX 258: Performed by: EMERGENCY MEDICINE

## 2023-12-06 PROCEDURE — 99285 EMERGENCY DEPT VISIT HI MDM: CPT

## 2023-12-06 PROCEDURE — 6360000002 HC RX W HCPCS: Performed by: EMERGENCY MEDICINE

## 2023-12-06 RX ORDER — 0.9 % SODIUM CHLORIDE 0.9 %
30 INTRAVENOUS SOLUTION INTRAVENOUS ONCE
Status: COMPLETED | OUTPATIENT
Start: 2023-12-06 | End: 2023-12-07

## 2023-12-06 RX ADMIN — PIPERACILLIN AND TAZOBACTAM 3375 MG: 3; .375 INJECTION, POWDER, LYOPHILIZED, FOR SOLUTION INTRAVENOUS at 23:03

## 2023-12-06 RX ADMIN — VANCOMYCIN HYDROCHLORIDE 1000 MG: 1 INJECTION, POWDER, LYOPHILIZED, FOR SOLUTION INTRAVENOUS at 23:04

## 2023-12-06 RX ADMIN — SODIUM CHLORIDE 1539 ML: 9 INJECTION, SOLUTION INTRAVENOUS at 23:02

## 2023-12-06 ASSESSMENT — LIFESTYLE VARIABLES
HOW MANY STANDARD DRINKS CONTAINING ALCOHOL DO YOU HAVE ON A TYPICAL DAY: PATIENT DOES NOT DRINK
HOW OFTEN DO YOU HAVE A DRINK CONTAINING ALCOHOL: NEVER

## 2023-12-06 ASSESSMENT — PAIN - FUNCTIONAL ASSESSMENT: PAIN_FUNCTIONAL_ASSESSMENT: NONE - DENIES PAIN

## 2023-12-07 VITALS
DIASTOLIC BLOOD PRESSURE: 71 MMHG | OXYGEN SATURATION: 95 % | BODY MASS INDEX: 18.46 KG/M2 | RESPIRATION RATE: 20 BRPM | TEMPERATURE: 97.6 F | HEART RATE: 79 BPM | HEIGHT: 62 IN | SYSTOLIC BLOOD PRESSURE: 150 MMHG | WEIGHT: 100.31 LBS

## 2023-12-07 LAB
ANION GAP SERPL CALCULATED.3IONS-SCNC: 8 MMOL/L (ref 3–16)
BASOPHILS # BLD: 0 K/UL (ref 0–0.2)
BASOPHILS NFR BLD: 0 %
BUN SERPL-MCNC: 36 MG/DL (ref 7–20)
CALCIUM SERPL-MCNC: 8.1 MG/DL (ref 8.3–10.6)
CHLORIDE SERPL-SCNC: 111 MMOL/L (ref 99–110)
CO2 SERPL-SCNC: 26 MMOL/L (ref 21–32)
CREAT SERPL-MCNC: 0.9 MG/DL (ref 0.6–1.2)
DEPRECATED RDW RBC AUTO: 15.2 % (ref 12.4–15.4)
EKG ATRIAL RATE: 81 BPM
EKG DIAGNOSIS: NORMAL
EKG P AXIS: 66 DEGREES
EKG P-R INTERVAL: 140 MS
EKG Q-T INTERVAL: 362 MS
EKG QRS DURATION: 60 MS
EKG QTC CALCULATION (BAZETT): 420 MS
EKG R AXIS: 31 DEGREES
EKG T AXIS: 53 DEGREES
EKG VENTRICULAR RATE: 81 BPM
EOSINOPHIL # BLD: 0 K/UL (ref 0–0.6)
EOSINOPHIL NFR BLD: 0 %
GFR SERPLBLD CREATININE-BSD FMLA CKD-EPI: 58 ML/MIN/{1.73_M2}
GLUCOSE SERPL-MCNC: 131 MG/DL (ref 70–99)
HCT VFR BLD AUTO: 34.9 % (ref 36–48)
HGB BLD-MCNC: 11.7 G/DL (ref 12–16)
LACTATE BLDV-SCNC: 0.9 MMOL/L (ref 0.4–1.9)
LACTATE BLDV-SCNC: 1.8 MMOL/L (ref 0.4–1.9)
LYMPHOCYTES # BLD: 2.9 K/UL (ref 1–5.1)
LYMPHOCYTES NFR BLD: 11 %
MACROCYTES BLD QL SMEAR: ABNORMAL
MCH RBC QN AUTO: 31.2 PG (ref 26–34)
MCHC RBC AUTO-ENTMCNC: 33.4 G/DL (ref 31–36)
MCV RBC AUTO: 93.4 FL (ref 80–100)
MICROCYTES BLD QL SMEAR: ABNORMAL
MONOCYTES # BLD: 0.5 K/UL (ref 0–1.3)
MONOCYTES NFR BLD: 2 %
NEUTROPHILS # BLD: 22.6 K/UL (ref 1.7–7.7)
NEUTROPHILS NFR BLD: 73 %
NEUTS BAND NFR BLD MANUAL: 14 % (ref 0–7)
OVALOCYTES BLD QL SMEAR: ABNORMAL
PLATELET # BLD AUTO: 349 K/UL (ref 135–450)
PLATELET BLD QL SMEAR: ADEQUATE
PMV BLD AUTO: 8.2 FL (ref 5–10.5)
POTASSIUM SERPL-SCNC: 3.7 MMOL/L (ref 3.5–5.1)
RBC # BLD AUTO: 3.74 M/UL (ref 4–5.2)
SLIDE REVIEW: ABNORMAL
SODIUM SERPL-SCNC: 145 MMOL/L (ref 136–145)
VANCOMYCIN SERPL-MCNC: 12 UG/ML
WBC # BLD AUTO: 26 K/UL (ref 4–11)

## 2023-12-07 PROCEDURE — 6370000000 HC RX 637 (ALT 250 FOR IP): Performed by: NURSE PRACTITIONER

## 2023-12-07 PROCEDURE — 36415 COLL VENOUS BLD VENIPUNCTURE: CPT

## 2023-12-07 PROCEDURE — 6360000002 HC RX W HCPCS: Performed by: NURSE PRACTITIONER

## 2023-12-07 PROCEDURE — 93010 ELECTROCARDIOGRAM REPORT: CPT | Performed by: INTERNAL MEDICINE

## 2023-12-07 PROCEDURE — 2580000003 HC RX 258: Performed by: INTERNAL MEDICINE

## 2023-12-07 PROCEDURE — 6360000002 HC RX W HCPCS: Performed by: INTERNAL MEDICINE

## 2023-12-07 PROCEDURE — 80202 ASSAY OF VANCOMYCIN: CPT

## 2023-12-07 PROCEDURE — 92526 ORAL FUNCTION THERAPY: CPT

## 2023-12-07 PROCEDURE — 85025 COMPLETE CBC W/AUTO DIFF WBC: CPT

## 2023-12-07 PROCEDURE — 92610 EVALUATE SWALLOWING FUNCTION: CPT

## 2023-12-07 PROCEDURE — 80048 BASIC METABOLIC PNL TOTAL CA: CPT

## 2023-12-07 PROCEDURE — 2580000003 HC RX 258: Performed by: NURSE PRACTITIONER

## 2023-12-07 PROCEDURE — 2700000000 HC OXYGEN THERAPY PER DAY

## 2023-12-07 PROCEDURE — 83605 ASSAY OF LACTIC ACID: CPT

## 2023-12-07 PROCEDURE — 94761 N-INVAS EAR/PLS OXIMETRY MLT: CPT

## 2023-12-07 RX ORDER — ALBUTEROL SULFATE 90 UG/1
4 AEROSOL, METERED RESPIRATORY (INHALATION) EVERY 4 HOURS PRN
Status: DISCONTINUED | OUTPATIENT
Start: 2023-12-07 | End: 2023-12-07 | Stop reason: HOSPADM

## 2023-12-07 RX ORDER — PANTOPRAZOLE SODIUM 40 MG/1
40 TABLET, DELAYED RELEASE ORAL DAILY
Status: DISCONTINUED | OUTPATIENT
Start: 2023-12-07 | End: 2023-12-07 | Stop reason: HOSPADM

## 2023-12-07 RX ORDER — DONEPEZIL HYDROCHLORIDE 5 MG/1
5 TABLET, FILM COATED ORAL NIGHTLY
Status: DISCONTINUED | OUTPATIENT
Start: 2023-12-07 | End: 2023-12-07 | Stop reason: HOSPADM

## 2023-12-07 RX ORDER — LEVOTHYROXINE SODIUM 0.05 MG/1
50 TABLET ORAL DAILY
Status: DISCONTINUED | OUTPATIENT
Start: 2023-12-07 | End: 2023-12-07 | Stop reason: HOSPADM

## 2023-12-07 RX ORDER — ENOXAPARIN SODIUM 100 MG/ML
30 INJECTION SUBCUTANEOUS DAILY
Status: DISCONTINUED | OUTPATIENT
Start: 2023-12-07 | End: 2023-12-07 | Stop reason: DRUGHIGH

## 2023-12-07 RX ORDER — SODIUM CHLORIDE 0.9 % (FLUSH) 0.9 %
5-40 SYRINGE (ML) INJECTION EVERY 12 HOURS SCHEDULED
Status: DISCONTINUED | OUTPATIENT
Start: 2023-12-07 | End: 2023-12-07 | Stop reason: HOSPADM

## 2023-12-07 RX ORDER — LEVOFLOXACIN 750 MG/1
750 TABLET, FILM COATED ORAL EVERY OTHER DAY
Qty: 3 TABLET | Refills: 0 | Status: SHIPPED | OUTPATIENT
Start: 2023-12-07 | End: 2023-12-13

## 2023-12-07 RX ORDER — SODIUM CHLORIDE 0.9 % (FLUSH) 0.9 %
5-40 SYRINGE (ML) INJECTION PRN
Status: DISCONTINUED | OUTPATIENT
Start: 2023-12-07 | End: 2023-12-07 | Stop reason: HOSPADM

## 2023-12-07 RX ORDER — ACETAMINOPHEN 325 MG/1
650 TABLET ORAL EVERY 6 HOURS PRN
Status: DISCONTINUED | OUTPATIENT
Start: 2023-12-07 | End: 2023-12-07 | Stop reason: HOSPADM

## 2023-12-07 RX ORDER — SODIUM CHLORIDE 9 MG/ML
INJECTION, SOLUTION INTRAVENOUS PRN
Status: DISCONTINUED | OUTPATIENT
Start: 2023-12-07 | End: 2023-12-07 | Stop reason: HOSPADM

## 2023-12-07 RX ORDER — SODIUM CHLORIDE 9 MG/ML
INJECTION, SOLUTION INTRAVENOUS CONTINUOUS
Status: ACTIVE | OUTPATIENT
Start: 2023-12-07 | End: 2023-12-07

## 2023-12-07 RX ORDER — BENZONATATE 100 MG/1
100 CAPSULE ORAL 3 TIMES DAILY PRN
Status: DISCONTINUED | OUTPATIENT
Start: 2023-12-07 | End: 2023-12-07 | Stop reason: HOSPADM

## 2023-12-07 RX ORDER — HEPARIN SODIUM 5000 [USP'U]/ML
5000 INJECTION, SOLUTION INTRAVENOUS; SUBCUTANEOUS EVERY 8 HOURS SCHEDULED
Status: DISCONTINUED | OUTPATIENT
Start: 2023-12-07 | End: 2023-12-07

## 2023-12-07 RX ORDER — HEPARIN SODIUM 5000 [USP'U]/ML
5000 INJECTION, SOLUTION INTRAVENOUS; SUBCUTANEOUS 2 TIMES DAILY
Status: DISCONTINUED | OUTPATIENT
Start: 2023-12-07 | End: 2023-12-07 | Stop reason: HOSPADM

## 2023-12-07 RX ORDER — ACETAMINOPHEN 650 MG/1
650 SUPPOSITORY RECTAL EVERY 6 HOURS PRN
Status: DISCONTINUED | OUTPATIENT
Start: 2023-12-07 | End: 2023-12-07 | Stop reason: HOSPADM

## 2023-12-07 RX ORDER — FOLIC ACID 1 MG/1
1 TABLET ORAL DAILY
Status: DISCONTINUED | OUTPATIENT
Start: 2023-12-07 | End: 2023-12-07 | Stop reason: HOSPADM

## 2023-12-07 RX ORDER — LISINOPRIL 20 MG/1
20 TABLET ORAL DAILY
Status: DISCONTINUED | OUTPATIENT
Start: 2023-12-07 | End: 2023-12-07 | Stop reason: HOSPADM

## 2023-12-07 RX ADMIN — SODIUM CHLORIDE: 9 INJECTION, SOLUTION INTRAVENOUS at 01:08

## 2023-12-07 RX ADMIN — DONEPEZIL HYDROCHLORIDE 5 MG: 5 TABLET, FILM COATED ORAL at 01:28

## 2023-12-07 RX ADMIN — LEVOTHYROXINE SODIUM 50 MCG: 0.05 TABLET ORAL at 05:56

## 2023-12-07 RX ADMIN — FOLIC ACID 1 MG: 1 TABLET ORAL at 08:25

## 2023-12-07 RX ADMIN — PIPERACILLIN AND TAZOBACTAM 4500 MG: 4; .5 INJECTION, POWDER, LYOPHILIZED, FOR SOLUTION INTRAVENOUS at 05:56

## 2023-12-07 RX ADMIN — PANTOPRAZOLE SODIUM 40 MG: 40 TABLET, DELAYED RELEASE ORAL at 08:25

## 2023-12-07 RX ADMIN — HEPARIN SODIUM 5000 UNITS: 5000 INJECTION INTRAVENOUS; SUBCUTANEOUS at 05:56

## 2023-12-07 RX ADMIN — LISINOPRIL 20 MG: 20 TABLET ORAL at 08:25

## 2023-12-07 RX ADMIN — PIPERACILLIN AND TAZOBACTAM 3375 MG: 3; .375 INJECTION, POWDER, LYOPHILIZED, FOR SOLUTION INTRAVENOUS at 14:32

## 2023-12-07 RX ADMIN — BENZONATATE 100 MG: 100 CAPSULE ORAL at 01:28

## 2023-12-07 NOTE — PROGRESS NOTES
Speech Language Pathology  Clinical Bedside Swallow Assessment  Facility/Department: [facility] C5 - MED SURG/ORTHO        Recommendations:  Diet recommendation: IDDSI 7 Regular Solids/Easy to Chew; IDDSI 2 Mildly Thick (nectar) Liquids; Meds whole in puree  Instrumentation: Not clinically indicated at this time. Will continue to monitor  Risk management: upright for all intake, stay upright for at least 30 mins after intake, small bites/sips, close supervision, slow rate of intake, general aspiration precautions, and hold PO and contact SLP if s/s of aspiration or worsening respiratory status develop. Tan Alcala  : 3/26/1927 (80 y.o.)   MRN: [id number]  ROOM: [address] [place] [address]  ADMISSION DATE: 2023  PATIENT DIAGNOSIS(ES): Sepsis ([address]) [A41.9]  Severe sepsis ([address]) [A41.9, R65.20]  Pneumonia due to infectious organism, unspecified laterality, unspecified part of lung [J18.9]  Chief Complaint   Patient presents with    Cough     Pt from [address] [place] [address] for cough and not feeling well. EMS reports patient has had  a productive cough for the past few days. Patient is a DNR CC.       Patient Active Problem List    Diagnosis Date Noted    Pneumonia 2023    Acute bronchitis 2018    Sepsis vs SIRS  (WBC 15.7K, HR, Lactate>2) 2018    Dementia ([address])     Acute metabolic encephalopathy     Acute metabolic encephalopathy     HTN (hypertension) 2018    Esophageal stricture 2014     Past Medical History:   Diagnosis Date    Hypertension     Thyroid disease      Past Surgical History:   Procedure Laterality Date    OTHER SURGICAL HISTORY  14    ERCP foreigh body removal     Allergies   Allergen Reactions    Cephalosporins Rash     States she has no allergies       DATE ONSET: admit date 23    Date of Evaluation: 2023   Evaluating Therapist: KEEGAN Zazueta    Chart Reviewed: : [x] Yes [] No     Current Diet: ADULT DIET; swallow structures as they pertain to airway protection, results of assessment, and recommendations  Pt Education Response: verbalized understanding and would benefit from ongoing education    Duration/Frequency of Tx: 1 week, 3x/wk until 12/14/23    Individuals Consulted:   [x]  Patient     []  NP         [x]  RN   []  RD                   []  MD      []  Family Member                        []  PA    []  Other:      Safety Devices / Report:   [x]  All fall risk precautions in place []  Safety handoff completed with RN  [x]  Bed alarm in place  []  Left in bed     []  Chair alarm in place  []  Left in chair   [x]  Call light in reach   []  Other: Total Treatment Time / Charges       Time in Time out Total Time / units   Swallow Eval/Tx Time  1321 1348  27 min / 2 units     Signature:    Tiffani Marcano Lakesha, [address] [facility] CQ#[id number]  Speech-Language Pathologist

## 2023-12-07 NOTE — PROGRESS NOTES
4 Eyes Skin Assessment     NAME:  Soni Ivan  YOB: 1927  MEDICAL RECORD NUMBER:  [id number]    The patient is being assessed for  Admission    I agree that at least one RN has performed a thorough Head to Toe Skin Assessment on the patient. ALL assessment sites listed below have been assessed. Areas assessed by both nurses:    Head, Face, Ears, Shoulders, Back, Chest, Arms, Elbows, Hands, Sacrum. Buttock, Coccyx, Ischium, Legs. Feet and Heels, and Under Medical Devices         Does the Patient have a Wound?  No noted wound(s)       Donavon Prevention initiated by RN: Yes  Wound Care Orders initiated by RN: No    Pressure Injury (Stage 3,4, Unstageable, DTI, NWPT, and Complex wounds) if present, place Wound referral order by RN under : No    New Ostomies, if present place, Ostomy referral order under : No     Nurse 1 eSignature: Electronically signed by Gregory Flores RN on 12/7/23 at 7:14 AM EST    **SHARE this note so that the co-signing nurse can place an eSignature**    Nurse 2 eSignature: {Esignature:[id number]}

## 2023-12-07 NOTE — CARE COORDINATION
CASE MANAGEMENT DISCHARGE SUMMARY      Discharge to: Harpreet Johnson w/[facility]. IMM given: 12/7/2023    New Durable Medical Equipment ordered/agency:     Transportation:    Medical Transport explained to pt/family. Pt/family voice no agency preference. Agency used:   time:6pm   Ambulance form completed: Yes    Confirmed discharge plan with:     Patient: yes. Family:  yes. Oscar Fernandez [phone] NO answer, Aamir Merida [phone] No answer, VM left     Facility/Agency, name:  [facility]    Phone number for report to facility: [phone]      RN, name: Jhoana Chacon     Note: Discharging nurse to complete [facility], reconcile [facility], and place final copy with patient's discharge packet. RN to ensure that written prescriptions for  Level II medications are sent with patient to the facility as per protocol.

## 2023-12-07 NOTE — PROGRESS NOTES
RN called Galen Hunt of [facility] to give report, no answer. VM left with name and callback number. RN will call again shortly.

## 2023-12-07 NOTE — PROGRESS NOTES
12/07/23 0111   RT Protocol   History Pulmonary Disease 1   Respiratory pattern 0   Breath sounds 2   Cough 0   Indications for Bronchodilator Therapy On home bronchodilators   Bronchodilator Assessment Score 3

## 2023-12-07 NOTE — CARE COORDINATION
[facility] active w/pt. If pt to d/c Hospice will arrange transport for pt.  made aware Rubio Connors w/[facility] would like to speak w/him r/t pt care.   Electronically signed by Mallory Mae RN on 12/7/2023 at 3:19 PM

## 2023-12-07 NOTE — PROGRESS NOTES
Patient admitted from ED to bed 532. Pt with history of dementia. Pt alert and oriented to self and place. Disoriented to time and situation at times. Pt is pleasantly confused. Calm and cooperative. Patient oriented to room environment and use of call light. Pt verbalized understanding but will need frequent reminders. Patient instructed about the schedule of the day including: vital sign frequency, lab draws, daily weights, I &O's and plan of care. Telemetry box in place, patient aware of placement and reason. Bed locked, in lowest position, side rails up 3/4, nursing call light within reach. See flowsheet for vitals and assessments. See MAR for current meds.

## 2023-12-07 NOTE — DISCHARGE INSTR - COC
CASE MANAGEMENT/SOCIAL WORK SECTION    Inpatient Status Date: ***    Readmission Risk Assessment Score:  Readmission Risk              Risk of Unplanned Readmission:  14           Discharging to Facility/ Agency   Name:   Address:  Phone:  Fax:    Dialysis Facility (if applicable)   Name:  Address:  Dialysis Schedule:  Phone:  Fax:    / signature: {Esignature:[id number]}    PHYSICIAN SECTION    Prognosis: Fair    Condition at Discharge: Stable    Rehab Potential (if transferring to Rehab): Fair    Recommended Labs or Other Treatments After Discharge:   Hospice    Physician Certification: I certify the above information and transfer of Simran Magdaleno  is necessary for the continuing treatment of the diagnosis listed and that she requires Hospice.     Update Admission H&P: No change in H&P    PHYSICIAN SIGNATURE:  Electronically signed by Niurka Valero MD on 12/7/23 at 4:51 PM EST

## 2023-12-07 NOTE — PROGRESS NOTES
[]No    ------------------------------------------------------------------------------------------------------------------------------------------------------------------------    MDM    [x] High (any 2)    A. Problems (any 1)  [x] Acute/Chronic Illness/injury posing threat to life or bodily function:  Pneumonia, Respiratory Failure  [] Severe exacerbation of chronic illness:    ---------------------------------------------------------------------  B. Risk of Treatment (any 1)   [x] Drugs/treatments that require intensive monitoring for toxicity include:    [x] IV ABX requiring serial renal monitoring for nephrotoxicity:  Stopping Vancomycin. [] IV Narcotic analgesia for adverse drug reaction  [] Aggressive IV diuresis requiring serial monitoring for renal impairment and electrolyte derangements  [] Critical electrolyte abnormalities requiring IV replacement and close serial monitoring  [] Insulin - monitoring serial FSBS for Hypoglycemic adverse drug reaction  [] Other -   [] Change in code status:    [] Decision to escalate care:    [] Major surgery/procedure with associated risk factors:    ----------------------------------------------------------------------  C. Data (any 2)  [x] Discussed current management and discharge planning options with Case Management. [] Discussed management of the case with:    [x] Telemetry personally reviewed and interpreted as documented above    [] Imaging personally reviewed and interpreted, includes:    [] Data Review (any 3)  [] All available Consultant notes from yesterday/today were reviewed  [] All current labs were reviewed and interpreted for clinical significance   [] Appropriate follow-up labs were ordered  [] Collateral history obtained from:      Medications:  Personally reviewed in detail in conjunction w/ labs as documented for evidence of drug toxicity.      Infusion Medications    sodium chloride      sodium chloride 75 mL/hr at 12/07/23 0108     Scheduled Medications    donepezil  5 mg Oral Nightly    folic acid  1 mg Oral Daily    levothyroxine  50 mcg Oral Daily    lisinopril  20 mg Oral Daily    pantoprazole  40 mg Oral Daily    sodium chloride flush  5-40 mL IntraVENous 2 times per day    piperacillin-tazobactam  4,500 mg IntraVENous Once    And    piperacillin-tazobactam  3,375 mg IntraVENous Q8H    vancomycin (VANCOCIN) intermittent dosing (placeholder)   Other RX Placeholder    vancomycin  1,000 mg IntraVENous Once    heparin (porcine)  5,000 Units SubCUTAneous BID     PRN Meds: albuterol sulfate HFA, sodium chloride flush, sodium chloride, acetaminophen **OR** acetaminophen, benzonatate     Labs:  Personally reviewed and interpreted for clinical significance. Recent Labs     12/06/23 2128 12/07/23  0548   WBC 26.1* 26.0*   HGB 13.8 11.7*   HCT 42.3 34.9*   * 349     Recent Labs     12/06/23 2156 12/07/23  0548    145   K 5.0 3.7    111*   CO2 24 26   BUN 41* 36*   CREATININE 1.1 0.9   CALCIUM 9.2 8.1*     No results for input(s): \"PROBNP\", \"TROPHS\" in the last 72 hours. No results for input(s): \"LABA1C\" in the last 72 hours. Recent Labs     12/06/23 2156   AST 39*   ALT 21   BILITOT 0.6   ALKPHOS 121     No results for input(s): \"INR\", \"LACTA\", \"TSH\" in the last 72 hours. Urine Cultures:   Lab Results   Component Value Date/Time    LABURIN No growth at 18 to 36 hours 06/30/2021 06:46 PM     Blood Cultures:   Lab Results   Component Value Date/Time    BC No growth after 5 days of incubation. 08/30/2018 08:02 PM     Lab Results   Component Value Date/Time    BLOODCULT2 No growth after 5 days of incubation.  08/30/2018 08:40 PM     Organism: No results found for: \"ORG\"      Yonis Georges MD

## 2023-12-07 NOTE — PLAN OF CARE
SLP completed evaluation. Please refer to notes in EMR. Tiffani Marcano Whitmer, [address] [id number]  Speech-Language Pathologist

## 2023-12-07 NOTE — PLAN OF CARE
Problem: Discharge Planning  Goal: Discharge to home or other facility with appropriate resources  Outcome: Progressing     Problem: Neurosensory - Adult  Goal: Achieves stable or improved neurological status  12/7/2023 1141 by Nilesh Paz RN  Outcome: Progressing  12/7/2023 0209 by Ty Peraza RN  Outcome: Progressing     Problem: Respiratory - Adult  Goal: Achieves optimal ventilation and oxygenation  12/7/2023 1141 by Nilesh Paz RN  Outcome: Progressing  12/7/2023 0209 by Ty Peraza RN  Outcome: Progressing     Problem: Cardiovascular - Adult  Goal: Maintains optimal cardiac output and hemodynamic stability  12/7/2023 1141 by Nilesh Paz RN  Outcome: Progressing  12/7/2023 0209 by Ty Peraza RN  Outcome: Progressing  Goal: Absence of cardiac dysrhythmias or at baseline  12/7/2023 1141 by Nilesh Paz RN  Outcome: Progressing  12/7/2023 0209 by Ty Peraza RN  Outcome: Progressing     Problem: Skin/Tissue Integrity - Adult  Goal: Skin integrity remains intact  Outcome: Progressing  Goal: Oral mucous membranes remain intact  12/7/2023 1141 by Nilesh Paz RN  Outcome: Progressing  12/7/2023 0209 by Ty Peraza RN  Outcome: Progressing     Problem: Musculoskeletal - Adult  Goal: Return mobility to safest level of function  12/7/2023 1141 by Nilesh Paz RN  Outcome: Progressing  12/7/2023 0209 by Ty Peraza RN  Outcome: Progressing  Goal: Maintain proper alignment of affected body part  12/7/2023 1141 by Nilesh Paz RN  Outcome: Progressing  12/7/2023 0209 by Ty Peraza RN  Outcome: Progressing  Goal: Return ADL status to a safe level of function  12/7/2023 1141 by Nilesh Paz RN  Outcome: Progressing  12/7/2023 0209 by Ty Peraza RN  Outcome: Progressing     Problem: Gastrointestinal - Adult  Goal: Maintains adequate nutritional intake  12/7/2023 1141 by Nilesh Paz RN  Outcome: Progressing  12/7/2023 0209 by Ty Peraza RN  Outcome: Progressing

## 2023-12-07 NOTE — CARE COORDINATION
Case Management Assessment  Initial Evaluation    Date/Time of Evaluation: 12/7/2023 2:48 PM  Assessment Completed by: Justin Figueroa RN    If patient is discharged prior to next notation, then this note serves as note for discharge by case management. Patient Name: Wyatt Cartagena                   YOB: 1927  Diagnosis: Sepsis ([address]) [A41.9]  Severe sepsis ([address]) [A41.9, R65.20]  Pneumonia due to infectious organism, unspecified laterality, unspecified part of lung [J18.9]                   Date / Time: 12/6/2023  9:11 PM    Patient Admission Status: Inpatient   Readmission Risk (Low < 19, Mod (19-27), High > 27): No data recorded  Current PCP: Eneida Costello  PCP verified by CM? Yes    Chart Reviewed: Yes      History Provided by: Other (see comment) Kayce Hansen of [facility])  Patient Orientation: Other (see comment) (Dementai/Confused)    Patient Cognition: Dementia / Early Alzheimer's    Hospitalization in the last 30 days (Readmission):  No    If yes, Readmission Assessment in  Navigator will be completed. Advance Directives:      Code Status: DNR-CC   Patient's Primary Decision Maker is: Named in [address]      Discharge Planning:    Patient lives with: Other (Comment) (AL) Type of Home: Assisted living  Primary Care Giver:  Other (Comment) ([place] staff)  Patient Support Systems include: Children, Family Members   Current Financial resources: Medicare  Current community resources: Assisted Living  Current services prior to admission: Extended Care Facility            Current DME:              Type of Home Care services:  Housekeeping, Hospice, Safety Alert, Nursing Services    ADLS  Prior functional level: Assistance with the following:, Bathing, Dressing, Toileting, Cooking, Housework, Shopping, Mobility  Current functional level: Assistance with the following:, Bathing, Dressing, Toileting, Feeding, Cooking, Housework, Shopping, Mobility    PT AM-PAC:   /24  OT AM-PAC:

## 2023-12-07 NOTE — H&P
Hospital Medicine History & Physical      Date of Admission: 12/6/2023    Date of Service:  Pt seen/examined on 12/6/2023     [x]Admitted to Inpatient with expected LOS greater than two midnights due to medical therapy. []Placed in Observation status. Chief Admission Complaint:  Cough    Presenting Admission History:      80 y.o. female, with PMH of dementia, hypothyroidism and HTN, who presented to [facility] with a cough. The patient is a poor historian d/t h/o dementia, but history obtained from the patient and review of EMR. The patient resides at the [place] of [address] home in the memory care unit. Staff reported that the patient has not been feeling well for the last couple of days and has been experiencing a cough. Over the last couple of days the patient's cough has gotten increasingly worse and productive with whitish thick sputum. Per EMR, upon arrival to the emergency department the patient's SpO2 was 89-93% on room air. She was placed on 2 L of supplemental oxygen via nasal cannula. In the emergency department a chest x-ray was obtained that revealed chronic interstitial lung disease with mild superimposed pneumonitis in the periphery of the left lung and possibly bilateral upper lobes. The patient's procalcitonin was 0.26. Upon further workup, the patient does meet sepsis criteria with a heart rate of 95, respiratory rate 29, lactic 2.0 and WBC 26.1. Blood cultures were obtained and the patient did receive 30 mL/KG IV fluid bolus. She was also started on Zosyn and vancomycin. The patient was admitted for further evaluation and treatment. She denied any other associated symptoms as well as any aggravating and/or alleviating factors. At the time of this assessment, the patient was resting comfortably in bed. He currently denies any chest pain, back pain, abdominal pain, shortness of breath, numbness, tingling, N/V/C/D, fever and/or chills.      Assessment/Plan: to light. Conjunctivae/corneas clear. Respiratory:  Normal respiratory effort. Diminished breath sounds bilaterally t/o. 2 LNC  Cardiovascular:  Regular rate and rhythm with normal S1/S2 without murmurs, rubs or gallops. Abdomen:  Soft, non-tender, non-distended with normal bowel sounds. Musculoskelatal:  No clubbing, cyanosis or edema bilaterally. Full range of motion without deformity. Neurologic:  Neurovascularly intact. Cranial nerves: II-XII intact, grossly non-focal.  Psychiatric:  Alert and oriented to self, thought content appropriate, normal insight  Skin:  Skin color, texture, turgor normal.  No significant rashes or lesions.   Capillary Refill:  Brisk,< 3 seconds   Peripheral Pulses:  +2 palpable, equal bilaterally     Diet: [x]Adult/General  []Cardiac  []Diabetic  []Low Fat  []NPO  []Other     DVT Prophylaxis: [x]PPx LMWH  []SQ Heparin  []IPC/SCDs  []Eliquis  []Xarelto  []Coumadin     Code status: [x]Full  []DNR/CCA  []Limited (DNR/CCA with Do Not Intubate)  []DNRCC    PT/OT Eval Status: [x]NOT yet ordered  []Ordered and Pending  []Home independently  []Home w/ assist  []HHC  []SNF    Anticipated Discharge Day/Date:  2-3 days pending clinical improvement    Patient [facility] at [facility]    Anticipated Discharge Location: [][place]  [facility]  [x]SNF  []Acute Rehab  []ECF  []LTAC  []Hospice    Consults:      IP CONSULT TO HOSPITALIST    This patient has a high likelihood of being discharged tomorrow and is appropriate for A1 Discharge Unit in AM pending clinical course overnight: []Yes  [x]No    --------------------------------------------------------------------------------------------------------------------------------------------------------------------    Imaging:     XR CHEST PORTABLE    Result Date: 12/6/2023  EXAMINATION: ONE XRAY VIEW OF THE CHEST 12/6/2023 9:24 pm COMPARISON: August 14, 2021 HISTORY: ORDERING SYSTEM PROVIDED HISTORY: shortness of breath TECHNOLOGIST PROVIDED HISTORY: Reason for exam:->shortness of breath Reason for Exam: shortness of breath - cough FINDINGS: Cardiomediastinal silhouette is within normal limits of size. Chronic interstitial lung disease with some superimposed reticulonodular and mild hazy opacity in the bilateral upper lobes and mid left lung field. No pleural effusion or pneumothorax. Degenerative changes of the spine. Chronic interstitial lung disease with mild superimposed pneumonitis in the periphery of the left lung and possibly bilateral upper lobes. PCP: Alexia Miller    Social History:      TOBACCO:   reports that she has quit smoking. Her smoking use included cigarettes. She has never used smokeless tobacco.    ETOH:   reports current alcohol use of about 1.0 standard drink of alcohol per week. Family History:  Reviewed in detail and negative for DM, Early CAD, Cancer (except as below). Positive as follows:    History reviewed. No pertinent family history. Past Medical History:        Diagnosis Date    Hypertension     Thyroid disease      Past Surgical History:        Procedure Laterality Date    OTHER SURGICAL HISTORY  12/4/14    ERCP foreigh body removal     Medications Prior to Admission:   Prior to Admission medications    Medication Sig Start Date End Date Taking? Authorizing Provider   albuterol sulfate HFA (PROVENTIL HFA) 108 (90 Base) MCG/ACT inhaler Inhale 4 puffs into the lungs every 4 hours as needed for Wheezing or Shortness of Breath (Space out to every 6 hours as symptoms improve) Space out to every 6 hours as symptoms improve.  6/30/21   Claudia Lacy MD   ondansetron (ZOFRAN) 4 MG tablet Take 1 tablet by mouth every 8 hours as needed for Nausea 3/19/21   Denise Sifuentes MD   donepezil (ARICEPT) 5 MG tablet Take 5 mg by mouth nightly    Provider, MD Faraz   folic acid (FOLVITE) 1 MG tablet Take 1 tablet by mouth daily 4/4/18   Katiuska Ott MD   levothyroxine (SYNTHROID) 25 MCG tablet Take 1

## 2023-12-07 NOTE — PROGRESS NOTES
12/7  Vanc random = 12 mcg/mL at 0548. Give vancomycin 1000 mg x1. Check level tomorrow in the AM (12/8 at 0600).   King Yuan, PharmMONA  12/7/2023 6:32 AM

## 2023-12-10 LAB
BACTERIA BLD CULT ORG #2: NORMAL
BACTERIA BLD CULT: NORMAL

## 2023-12-16 NOTE — PROGRESS NOTES
Physician Progress Note      Noemi Li  [facility] #:                  [id number]  :                       3/26/1927  ADMIT DATE:       2023 9:11 PM  [facility] DATE:        2023 6:52 PM  RESPONDING  PROVIDER #:        Audra Roberts MD          QUERY TEXT:    Patient admitted with sepsis and pneumonia. Noted documentation of acute   respiratory failure in H&P. In order to support the diagnosis of acute   respiratory failure, please include additional clinical indicators in your   documentation. Or please document if the diagnosis of acute respiratory   failure has been ruled out after further study. The medical record reflects the following:  Risk Factors: pneumonia, interstitial lung disease  Clinical Indicators: RR 16-29, SPO2 92-96%, brief supplemental oxygen, -spo2   89-93% on room air  Treatment: supplemental; oxygen, CXR, acapella, supportive care    Thank you,  Pretty Robertson RN, CDS  [email or web address]    Acute Respiratory Failure Clinical Indicators per [employer] Training Guide and   Quick Reference Guide:  pO2 < 60 mmHg or SpO2 (pulse oximetry) < 91% breathing room air  pCO2 > 50 and pH < 7.35  P/F ratio (pO2 / FIO2) < 300  pO2 decrease or pCO2 increase by 10 mmHg from baseline (if known)  Supplemental oxygen of 40% or more  Presence of respiratory distress, tachypnea, dyspnea, shortness of breath,   wheezing  Unable to speak in complete sentences  Use of accessory muscles to breathe  Extreme anxiety and feeling of impending doom  Tripod position  Confusion/altered mental status/obtunded  Options provided:  -- Acute Respiratory Failure ruled out after study  -- Acute Respiratory Failure as evidenced by, Please document evidence.   -- Other - I will add my own diagnosis  -- Disagree - Not applicable / Not valid  -- Disagree - Clinically unable to determine / Unknown  -- Refer to Clinical Documentation Reviewer    PROVIDER RESPONSE TEXT:    Acute Respiratory Failure has